# Patient Record
Sex: FEMALE | Race: WHITE | NOT HISPANIC OR LATINO | Employment: OTHER | URBAN - METROPOLITAN AREA
[De-identification: names, ages, dates, MRNs, and addresses within clinical notes are randomized per-mention and may not be internally consistent; named-entity substitution may affect disease eponyms.]

---

## 2023-04-06 ENCOUNTER — TELEPHONE (OUTPATIENT)
Dept: PRIMARY CARE | Facility: CLINIC | Age: 73
End: 2023-04-06
Payer: MEDICARE

## 2023-04-27 DIAGNOSIS — E78.5 HYPERLIPIDEMIA, UNSPECIFIED HYPERLIPIDEMIA TYPE: Primary | ICD-10-CM

## 2023-04-27 RX ORDER — ROSUVASTATIN CALCIUM 10 MG/1
10 TABLET, COATED ORAL DAILY
Qty: 90 TABLET | Refills: 0 | Status: SHIPPED | OUTPATIENT
Start: 2023-04-27 | End: 2023-05-05 | Stop reason: SDUPTHER

## 2023-04-27 RX ORDER — ROSUVASTATIN CALCIUM 10 MG/1
10 TABLET, COATED ORAL DAILY
COMMUNITY
End: 2023-04-27 | Stop reason: SDUPTHER

## 2023-05-01 ENCOUNTER — OFFICE VISIT (OUTPATIENT)
Dept: PRIMARY CARE | Facility: CLINIC | Age: 73
End: 2023-05-01
Payer: MEDICARE

## 2023-05-01 VITALS
BODY MASS INDEX: 25.11 KG/M2 | DIASTOLIC BLOOD PRESSURE: 74 MMHG | SYSTOLIC BLOOD PRESSURE: 123 MMHG | RESPIRATION RATE: 15 BRPM | WEIGHT: 133 LBS | HEIGHT: 61 IN | HEART RATE: 75 BPM | OXYGEN SATURATION: 95 %

## 2023-05-01 DIAGNOSIS — N64.4 BREAST PAIN, LEFT: Primary | ICD-10-CM

## 2023-05-01 PROBLEM — H50.00 ESOTROPIA: Status: RESOLVED | Noted: 2023-05-01 | Resolved: 2023-05-01

## 2023-05-01 PROBLEM — L30.9 DERMATITIS, ECZEMATOID: Status: ACTIVE | Noted: 2023-05-01

## 2023-05-01 PROBLEM — F43.9 STRESS: Status: ACTIVE | Noted: 2023-05-01

## 2023-05-01 PROBLEM — J45.909 ASTHMA (HHS-HCC): Status: ACTIVE | Noted: 2023-05-01

## 2023-05-01 PROBLEM — I38 HEART VALVE REGURGITATION: Status: ACTIVE | Noted: 2023-05-01

## 2023-05-01 PROBLEM — J20.9 ACUTE BRONCHITIS, UNSPECIFIED: Status: RESOLVED | Noted: 2023-05-01 | Resolved: 2023-05-01

## 2023-05-01 PROBLEM — D12.6 COLON ADENOMA: Status: ACTIVE | Noted: 2023-05-01

## 2023-05-01 PROBLEM — I10 BENIGN ESSENTIAL HYPERTENSION: Status: ACTIVE | Noted: 2023-05-01

## 2023-05-01 PROBLEM — H53.2 DIPLOPIA: Status: RESOLVED | Noted: 2023-05-01 | Resolved: 2023-05-01

## 2023-05-01 PROBLEM — Z95.0 PACEMAKER: Status: ACTIVE | Noted: 2023-05-01

## 2023-05-01 PROBLEM — E78.49 FAMILIAL COMBINED HYPERLIPIDEMIA: Status: ACTIVE | Noted: 2023-05-01

## 2023-05-01 PROBLEM — F41.9 ANXIETY: Status: ACTIVE | Noted: 2023-05-01

## 2023-05-01 PROBLEM — I50.30 DIASTOLIC CONGESTIVE HEART FAILURE (MULTI): Status: ACTIVE | Noted: 2023-05-01

## 2023-05-01 PROBLEM — D86.9 SARCOIDOSIS: Status: ACTIVE | Noted: 2023-05-01

## 2023-05-01 PROCEDURE — 3078F DIAST BP <80 MM HG: CPT | Performed by: INTERNAL MEDICINE

## 2023-05-01 PROCEDURE — 1160F RVW MEDS BY RX/DR IN RCRD: CPT | Performed by: INTERNAL MEDICINE

## 2023-05-01 PROCEDURE — 1159F MED LIST DOCD IN RCRD: CPT | Performed by: INTERNAL MEDICINE

## 2023-05-01 PROCEDURE — 3074F SYST BP LT 130 MM HG: CPT | Performed by: INTERNAL MEDICINE

## 2023-05-01 PROCEDURE — 99213 OFFICE O/P EST LOW 20 MIN: CPT | Performed by: INTERNAL MEDICINE

## 2023-05-01 PROCEDURE — 1036F TOBACCO NON-USER: CPT | Performed by: INTERNAL MEDICINE

## 2023-05-01 RX ORDER — CARVEDILOL 6.25 MG/1
6.25 TABLET ORAL
COMMUNITY

## 2023-05-01 RX ORDER — ACETAMINOPHEN 500 MG
1 TABLET ORAL DAILY
COMMUNITY

## 2023-05-01 RX ORDER — FLUOXETINE HYDROCHLORIDE 40 MG/1
40 CAPSULE ORAL DAILY
COMMUNITY
End: 2023-10-12 | Stop reason: SDUPTHER

## 2023-05-01 RX ORDER — SPIRONOLACTONE 25 MG/1
25 TABLET ORAL DAILY
COMMUNITY

## 2023-05-01 RX ORDER — BUMETANIDE 1 MG/1
1 TABLET ORAL DAILY
COMMUNITY

## 2023-05-01 ASSESSMENT — ENCOUNTER SYMPTOMS: PAIN: 1

## 2023-05-01 NOTE — PROGRESS NOTES
Subjective   Patient ID: Radhika Villagomez is a 72 y.o. female who presents for Pain (Pt experiencing left breast pain and burning x 3 weeks ).  Pain         left breast   pain   x 3 weeks.   Pain more at night when trying to  sleep when not  active . Burning - tylenol helps.   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   Goes to gym and lifts  wt.   Mammo  oct            Review of Systems   Genitourinary:         Left breast pain      3 weeks.     Objective   Physical Exam  Vitals and nursing note reviewed. Exam conducted with a chaperone present.   Constitutional:       Appearance: Normal appearance.   HENT:      Head: Normocephalic and atraumatic.      Mouth/Throat:      Pharynx: Oropharynx is clear.   Eyes:      Conjunctiva/sclera: Conjunctivae normal.   Cardiovascular:      Rate and Rhythm: Normal rate.   Pulmonary:      Effort: Pulmonary effort is normal.      Comments: Chest -  left breast  tenderness mild to palpation . No mass fonund.  No  axillary mass, but tender. No rash.   Musculoskeletal:      Cervical back: Neck supple.   Skin:     General: Skin is warm and dry.   Neurological:      General: No focal deficit present.      Mental Status: She is alert and oriented to person, place, and time. Mental status is at baseline.   Psychiatric:         Mood and Affect: Mood normal.         Behavior: Behavior normal.         Thought Content: Thought content normal.         Judgment: Judgment normal.         Assessment/Plan   Problem List Items Addressed This Visit          Medium    Breast pain, left - Primary    Relevant Orders    BI US breast complete left    BI mammo left diagnostic tomosynthesis

## 2023-05-05 DIAGNOSIS — E78.5 HYPERLIPIDEMIA, UNSPECIFIED HYPERLIPIDEMIA TYPE: ICD-10-CM

## 2023-05-05 RX ORDER — ROSUVASTATIN CALCIUM 10 MG/1
10 TABLET, COATED ORAL DAILY
Qty: 90 TABLET | Refills: 0 | Status: SHIPPED | OUTPATIENT
Start: 2023-05-05 | End: 2023-08-07 | Stop reason: SDUPTHER

## 2023-05-09 ENCOUNTER — TELEPHONE (OUTPATIENT)
Dept: PRIMARY CARE | Facility: CLINIC | Age: 73
End: 2023-05-09
Payer: MEDICARE

## 2023-05-09 NOTE — TELEPHONE ENCOUNTER
----- Message from Odalis Watson MD sent at 5/8/2023  3:16 PM EDT -----  Call  results message below.

## 2023-05-10 ENCOUNTER — APPOINTMENT (OUTPATIENT)
Dept: PRIMARY CARE | Facility: CLINIC | Age: 73
End: 2023-05-10
Payer: MEDICARE

## 2023-06-06 ENCOUNTER — PATIENT OUTREACH (OUTPATIENT)
Dept: PRIMARY CARE | Facility: CLINIC | Age: 73
End: 2023-06-06
Payer: MEDICARE

## 2023-06-13 ENCOUNTER — DOCUMENTATION (OUTPATIENT)
Dept: CARE COORDINATION | Facility: CLINIC | Age: 73
End: 2023-06-13
Payer: MEDICARE

## 2023-08-07 ENCOUNTER — TELEPHONE (OUTPATIENT)
Dept: PRIMARY CARE | Facility: CLINIC | Age: 73
End: 2023-08-07
Payer: MEDICARE

## 2023-08-07 DIAGNOSIS — E78.5 HYPERLIPIDEMIA, UNSPECIFIED HYPERLIPIDEMIA TYPE: ICD-10-CM

## 2023-08-07 RX ORDER — ROSUVASTATIN CALCIUM 10 MG/1
10 TABLET, COATED ORAL DAILY
Qty: 30 TABLET | Refills: 1 | Status: SHIPPED | OUTPATIENT
Start: 2023-08-07 | End: 2023-08-21 | Stop reason: SDUPTHER

## 2023-08-07 NOTE — TELEPHONE ENCOUNTER
----- Message from Odalis Watson MD sent at 8/7/2023 11:36 AM EDT -----  Schedule  awv for October.

## 2023-08-15 PROBLEM — I34.0 NONRHEUMATIC MITRAL VALVE REGURGITATION: Status: ACTIVE | Noted: 2023-08-15

## 2023-08-15 PROBLEM — Z87.74 HX OF ATRIAL SEPTAL DEFECT REPAIR: Status: ACTIVE | Noted: 2023-08-15

## 2023-08-15 PROBLEM — I51.89 DIASTOLIC DYSFUNCTION: Status: ACTIVE | Noted: 2023-05-01

## 2023-08-15 PROBLEM — I27.20 PULMONARY HTN (MULTI): Status: ACTIVE | Noted: 2023-08-15

## 2023-08-15 PROBLEM — I36.1 NONRHEUMATIC TRICUSPID VALVE REGURGITATION: Status: ACTIVE | Noted: 2023-08-15

## 2023-08-15 PROBLEM — I37.1 MODERATE PULMONARY VALVE INSUFFICIENCY: Status: ACTIVE | Noted: 2023-08-15

## 2023-08-15 PROBLEM — I51.7 ATRIAL ENLARGEMENT, BILATERAL: Status: ACTIVE | Noted: 2023-08-15

## 2023-08-21 DIAGNOSIS — E78.5 HYPERLIPIDEMIA, UNSPECIFIED HYPERLIPIDEMIA TYPE: ICD-10-CM

## 2023-08-21 RX ORDER — ROSUVASTATIN CALCIUM 10 MG/1
10 TABLET, COATED ORAL DAILY
Qty: 30 TABLET | Refills: 2 | Status: SHIPPED | OUTPATIENT
Start: 2023-08-21 | End: 2023-10-12 | Stop reason: SDUPTHER

## 2023-10-12 ENCOUNTER — OFFICE VISIT (OUTPATIENT)
Dept: PRIMARY CARE | Facility: CLINIC | Age: 73
End: 2023-10-12
Payer: MEDICARE

## 2023-10-12 VITALS
OXYGEN SATURATION: 94 % | WEIGHT: 133 LBS | HEIGHT: 61 IN | DIASTOLIC BLOOD PRESSURE: 66 MMHG | BODY MASS INDEX: 25.11 KG/M2 | HEART RATE: 72 BPM | SYSTOLIC BLOOD PRESSURE: 107 MMHG

## 2023-10-12 DIAGNOSIS — F41.9 ANXIETY: ICD-10-CM

## 2023-10-12 DIAGNOSIS — Z23 NEED FOR VACCINATION: ICD-10-CM

## 2023-10-12 DIAGNOSIS — J44.9 CHRONIC OBSTRUCTIVE PULMONARY DISEASE, UNSPECIFIED COPD TYPE (MULTI): ICD-10-CM

## 2023-10-12 DIAGNOSIS — Z29.11 NEED FOR RSV IMMUNIZATION: ICD-10-CM

## 2023-10-12 DIAGNOSIS — Z95.0 PACEMAKER: ICD-10-CM

## 2023-10-12 DIAGNOSIS — Z13.89 SCREENING FOR MULTIPLE CONDITIONS: ICD-10-CM

## 2023-10-12 DIAGNOSIS — Z12.11 SCREENING FOR COLORECTAL CANCER: ICD-10-CM

## 2023-10-12 DIAGNOSIS — I27.20 PULMONARY HTN (MULTI): ICD-10-CM

## 2023-10-12 DIAGNOSIS — E78.5 HYPERLIPIDEMIA, UNSPECIFIED HYPERLIPIDEMIA TYPE: ICD-10-CM

## 2023-10-12 DIAGNOSIS — Z12.31 ENCOUNTER FOR SCREENING MAMMOGRAM FOR BREAST CANCER: ICD-10-CM

## 2023-10-12 DIAGNOSIS — Z00.00 ROUTINE GENERAL MEDICAL EXAMINATION AT HEALTH CARE FACILITY: Primary | ICD-10-CM

## 2023-10-12 DIAGNOSIS — Z12.12 SCREENING FOR COLORECTAL CANCER: ICD-10-CM

## 2023-10-12 DIAGNOSIS — E78.49 FAMILIAL COMBINED HYPERLIPIDEMIA: ICD-10-CM

## 2023-10-12 DIAGNOSIS — Z11.59 NEED FOR HEPATITIS C SCREENING TEST: ICD-10-CM

## 2023-10-12 DIAGNOSIS — I51.89 DIASTOLIC DYSFUNCTION: ICD-10-CM

## 2023-10-12 DIAGNOSIS — Z13.6 SCREENING FOR CARDIOVASCULAR CONDITION: ICD-10-CM

## 2023-10-12 DIAGNOSIS — I37.1 MODERATE PULMONARY VALVE INSUFFICIENCY: ICD-10-CM

## 2023-10-12 DIAGNOSIS — Z13.1 DIABETES MELLITUS SCREENING: ICD-10-CM

## 2023-10-12 DIAGNOSIS — I34.0 NONRHEUMATIC MITRAL VALVE REGURGITATION: ICD-10-CM

## 2023-10-12 DIAGNOSIS — I44.2 COMPLETE HEART BLOCK (MULTI): ICD-10-CM

## 2023-10-12 PROBLEM — D12.6 COLON ADENOMA: Status: RESOLVED | Noted: 2023-05-01 | Resolved: 2023-10-12

## 2023-10-12 PROCEDURE — 90662 IIV NO PRSV INCREASED AG IM: CPT | Performed by: INTERNAL MEDICINE

## 2023-10-12 PROCEDURE — G0439 PPPS, SUBSEQ VISIT: HCPCS | Performed by: INTERNAL MEDICINE

## 2023-10-12 PROCEDURE — 1036F TOBACCO NON-USER: CPT | Performed by: INTERNAL MEDICINE

## 2023-10-12 PROCEDURE — 1126F AMNT PAIN NOTED NONE PRSNT: CPT | Performed by: INTERNAL MEDICINE

## 2023-10-12 PROCEDURE — 1160F RVW MEDS BY RX/DR IN RCRD: CPT | Performed by: INTERNAL MEDICINE

## 2023-10-12 PROCEDURE — 99213 OFFICE O/P EST LOW 20 MIN: CPT | Performed by: INTERNAL MEDICINE

## 2023-10-12 PROCEDURE — 3074F SYST BP LT 130 MM HG: CPT | Performed by: INTERNAL MEDICINE

## 2023-10-12 PROCEDURE — 1159F MED LIST DOCD IN RCRD: CPT | Performed by: INTERNAL MEDICINE

## 2023-10-12 PROCEDURE — 3078F DIAST BP <80 MM HG: CPT | Performed by: INTERNAL MEDICINE

## 2023-10-12 PROCEDURE — 1170F FXNL STATUS ASSESSED: CPT | Performed by: INTERNAL MEDICINE

## 2023-10-12 PROCEDURE — G0008 ADMIN INFLUENZA VIRUS VAC: HCPCS | Performed by: INTERNAL MEDICINE

## 2023-10-12 RX ORDER — ROSUVASTATIN CALCIUM 10 MG/1
10 TABLET, COATED ORAL DAILY
Qty: 90 TABLET | Refills: 3 | Status: SHIPPED | OUTPATIENT
Start: 2023-10-12 | End: 2024-01-29 | Stop reason: SDUPTHER

## 2023-10-12 RX ORDER — FLUOXETINE HYDROCHLORIDE 40 MG/1
40 CAPSULE ORAL DAILY
Qty: 90 CAPSULE | Refills: 3 | Status: SHIPPED | OUTPATIENT
Start: 2023-10-12

## 2023-10-12 ASSESSMENT — PATIENT HEALTH QUESTIONNAIRE - PHQ9
SUM OF ALL RESPONSES TO PHQ9 QUESTIONS 1 AND 2: 0
2. FEELING DOWN, DEPRESSED OR HOPELESS: NOT AT ALL
1. LITTLE INTEREST OR PLEASURE IN DOING THINGS: NOT AT ALL

## 2023-10-12 ASSESSMENT — ACTIVITIES OF DAILY LIVING (ADL)
BATHING: INDEPENDENT
TAKING_MEDICATION: INDEPENDENT
DRESSING: INDEPENDENT
MANAGING_FINANCES: INDEPENDENT
GROCERY_SHOPPING: INDEPENDENT

## 2023-10-12 ASSESSMENT — ENCOUNTER SYMPTOMS
CONSTITUTIONAL NEGATIVE: 1
DEPRESSION: 0

## 2023-10-12 NOTE — PROGRESS NOTES
"Subjective   Patient ID: Radhika Villagomez is a 73 y.o. female who presents for AWV (AWV, RFS, mammo order. ).  HPI    Awv    No  new probs      Just  refills      Review of Systems   Constitutional: Negative.    All other systems reviewed and are negative.      Objective   BP Readings from Last 3 Encounters:   10/12/23 107/66   05/01/23 123/74   10/04/22 112/61      Wt Readings from Last 3 Encounters:   10/12/23 60.3 kg (133 lb)   05/01/23 60.3 kg (133 lb)   10/04/22 62.1 kg (136 lb 12.8 oz)      BMI: Estimated body mass index is 25.13 kg/m² as calculated from the following:    Height as of this encounter: 1.549 m (5' 1\").    Weight as of this encounter: 60.3 kg (133 lb).    BSA: Estimated body surface area is 1.61 meters squared as calculated from the following:    Height as of this encounter: 1.549 m (5' 1\").    Weight as of this encounter: 60.3 kg (133 lb).  Physical Exam  Vitals and nursing note reviewed.   Constitutional:       Appearance: Normal appearance.   HENT:      Head: Normocephalic and atraumatic.      Nose: Nose normal.   Eyes:      Conjunctiva/sclera: Conjunctivae normal.   Cardiovascular:      Rate and Rhythm: Normal rate and regular rhythm.      Pulses: Normal pulses.      Heart sounds: Normal heart sounds.   Pulmonary:      Effort: Pulmonary effort is normal.      Breath sounds: Normal breath sounds.   Abdominal:      General: Abdomen is flat.   Musculoskeletal:         General: Normal range of motion.   Skin:     General: Skin is warm and dry.      Capillary Refill: Capillary refill takes less than 2 seconds.   Neurological:      General: No focal deficit present.      Mental Status: She is alert and oriented to person, place, and time. Mental status is at baseline.   Psychiatric:         Mood and Affect: Mood normal.         Behavior: Behavior normal.         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    Nonrheumatic mitral valve regurgitation I34.0    Pacemaker Z95.0    Pulmonary HTN (CMS/HCC) I27.20     Other Visit Diagnoses         Codes    Routine general medical examination at health care facility    -  Primary Z00.00    Relevant Orders    1 Year Follow Up In Primary Care - Wellness Exam    Hyperlipidemia, unspecified hyperlipidemia type     E78.5    Relevant Medications    rosuvastatin (Crestor) 10 mg tablet    Screening for multiple conditions     Z13.89    Diabetes mellitus screening     Z13.1    Relevant Orders    Comprehensive Metabolic Panel    Screening for cardiovascular condition     Z13.6    Relevant Orders    Lipid panel    Need for vaccination     Z23    Encounter for screening mammogram for breast cancer     Z12.31    Relevant Orders    BI mammo bilateral screening tomosynthesis    Need for hepatitis C screening test     Z11.59    Relevant Orders    Hepatitis C antibody    Screening for colorectal cancer     Z12.11, Z12.12    Relevant Orders    Colonoscopy Screening    Need for RSV immunization     Z29.11    Relevant Orders    Flu vaccine, quadrivalent, high-dose, preservative free, age 65y+ (FLUZONE) (Completed)          Chronic problems controlled  on current treatment  unless otherwise noted.   CHART  REVIEWED AND  RECONCILED WITH OLD DATA / UPDATED IN NEW SYSTEM:  MEDS,  PROBLEMS,  ALLERGIES, SOC HISTORY.

## 2023-10-19 ENCOUNTER — LAB (OUTPATIENT)
Dept: LAB | Facility: LAB | Age: 73
End: 2023-10-19
Payer: MEDICARE

## 2023-10-19 DIAGNOSIS — Z13.6 SCREENING FOR CARDIOVASCULAR CONDITION: ICD-10-CM

## 2023-10-19 DIAGNOSIS — Z13.1 DIABETES MELLITUS SCREENING: ICD-10-CM

## 2023-10-19 DIAGNOSIS — Z11.59 NEED FOR HEPATITIS C SCREENING TEST: ICD-10-CM

## 2023-10-19 LAB
ALBUMIN SERPL BCP-MCNC: 5.1 G/DL (ref 3.4–5)
ALP SERPL-CCNC: 68 U/L (ref 33–136)
ALT SERPL W P-5'-P-CCNC: 13 U/L (ref 7–45)
ANION GAP SERPL CALC-SCNC: 10 MMOL/L (ref 10–20)
AST SERPL W P-5'-P-CCNC: 23 U/L (ref 9–39)
BILIRUB SERPL-MCNC: 0.8 MG/DL (ref 0–1.2)
BUN SERPL-MCNC: 18 MG/DL (ref 6–23)
CALCIUM SERPL-MCNC: 10.2 MG/DL (ref 8.6–10.6)
CHLORIDE SERPL-SCNC: 98 MMOL/L (ref 98–107)
CHOLEST SERPL-MCNC: 157 MG/DL (ref 0–199)
CHOLESTEROL/HDL RATIO: 2.7
CO2 SERPL-SCNC: 32 MMOL/L (ref 21–32)
CREAT SERPL-MCNC: 0.69 MG/DL (ref 0.5–1.05)
GFR SERPL CREATININE-BSD FRML MDRD: >90 ML/MIN/1.73M*2
GLUCOSE SERPL-MCNC: 78 MG/DL (ref 74–99)
HCV AB SER QL: NONREACTIVE
HDLC SERPL-MCNC: 57.7 MG/DL
LDLC SERPL CALC-MCNC: 75 MG/DL
NON HDL CHOLESTEROL: 99 MG/DL (ref 0–149)
POTASSIUM SERPL-SCNC: 4.8 MMOL/L (ref 3.5–5.3)
PROT SERPL-MCNC: 7.7 G/DL (ref 6.4–8.2)
SODIUM SERPL-SCNC: 135 MMOL/L (ref 136–145)
TRIGL SERPL-MCNC: 123 MG/DL (ref 0–149)
VLDL: 25 MG/DL (ref 0–40)

## 2023-10-19 PROCEDURE — 80053 COMPREHEN METABOLIC PANEL: CPT

## 2023-10-19 PROCEDURE — 86803 HEPATITIS C AB TEST: CPT

## 2023-10-19 PROCEDURE — 80061 LIPID PANEL: CPT

## 2023-10-19 PROCEDURE — 36415 COLL VENOUS BLD VENIPUNCTURE: CPT

## 2023-10-27 PROBLEM — L81.4 OTHER MELANIN HYPERPIGMENTATION: Status: ACTIVE | Noted: 2023-08-14

## 2023-10-27 PROBLEM — G47.00 INSOMNIA: Status: ACTIVE | Noted: 2023-10-27

## 2023-10-27 PROBLEM — H52.209 ASTIGMATISM OF EYE: Status: ACTIVE | Noted: 2023-10-27

## 2023-10-27 PROBLEM — L82.1 OTHER SEBORRHEIC KERATOSIS: Status: ACTIVE | Noted: 2023-08-14

## 2023-10-27 PROBLEM — D22.5 MELANOCYTIC NEVI OF TRUNK: Status: ACTIVE | Noted: 2023-08-14

## 2023-10-27 PROBLEM — C44.619 BASAL CELL CARCINOMA OF SKIN OF LEFT UPPER LIMB, INCLUDING SHOULDER: Status: ACTIVE | Noted: 2023-08-14

## 2023-10-27 PROBLEM — R06.02 SHORTNESS OF BREATH: Status: ACTIVE | Noted: 2023-10-27

## 2023-10-27 PROBLEM — D18.01 HEMANGIOMA OF SKIN AND SUBCUTANEOUS TISSUE: Status: ACTIVE | Noted: 2023-08-14

## 2023-10-27 PROBLEM — D22.60 MELANOCYTIC NEVI OF UNSPECIFIED UPPER LIMB, INCLUDING SHOULDER: Status: ACTIVE | Noted: 2023-08-14

## 2023-10-27 PROBLEM — Z85.828 PERSONAL HISTORY OF OTHER MALIGNANT NEOPLASM OF SKIN: Status: ACTIVE | Noted: 2023-08-14

## 2023-10-27 PROBLEM — D48.5 NEOPLASM OF UNCERTAIN BEHAVIOR OF SKIN: Status: ACTIVE | Noted: 2023-08-14

## 2023-10-27 PROBLEM — D22.70 MELANOCYTIC NEVI OF UNSPECIFIED LOWER LIMB, INCLUDING HIP: Status: ACTIVE | Noted: 2023-08-14

## 2023-10-27 PROBLEM — L57.0 ACTINIC KERATOSIS: Status: ACTIVE | Noted: 2023-08-14

## 2023-10-31 ENCOUNTER — PROCEDURE VISIT (OUTPATIENT)
Dept: DERMATOLOGY | Facility: CLINIC | Age: 73
End: 2023-10-31
Payer: MEDICARE

## 2023-10-31 DIAGNOSIS — C44.612 BASAL CELL CARCINOMA (BCC) OF SKIN OF RIGHT UPPER EXTREMITY INCLUDING SHOULDER: ICD-10-CM

## 2023-10-31 PROCEDURE — 88305 TISSUE EXAM BY PATHOLOGIST: CPT | Mod: TC,DER | Performed by: DERMATOLOGY

## 2023-10-31 PROCEDURE — 88305 TISSUE EXAM BY PATHOLOGIST: CPT | Performed by: DERMATOLOGY

## 2023-10-31 PROCEDURE — 12032 INTMD RPR S/A/T/EXT 2.6-7.5: CPT | Performed by: DERMATOLOGY

## 2023-10-31 PROCEDURE — 11602 EXC TR-EXT MAL+MARG 1.1-2 CM: CPT | Performed by: DERMATOLOGY

## 2023-10-31 NOTE — PROGRESS NOTES
Excision Operative Note    Date of Surgery:  10/31/2023  Surgeon:  Loretta Puckett MD  Office Location:  [address] [facility]  [address]  [facility] [address]  Dept: [phone]  Dept Fax: [phone]  Referring Provider: Gurwinder Macias, APRN-CNP  [address] [facility] [address]  [place] [address]    Subjective   Radhika Villagomez is a 73 y.o. female who presents for the following: Excision (Right Volar Forearm- BCC) for basal cell carcinoma.    According to the patient, the lesion has been present for approximately 1 month at the time of diagnosis.  The lesion is not causing symptoms.  The lesion has not been treated previously.    The patient does have a pacemaker / defibrillator.  The patient does not have a heart valve / joint replacement.    The patient is not on blood thinners.   The patient does not have a history of hepatitis B or C.  The patient does not have a history of HIV.    The following portions of the chart were reviewed this encounter and updated as appropriate:         Assessment/Plan   Pre-procedure:   Obtained informed consent: written from patient  The surgical site was identified and confirmed with the patient.     Intra-operative:   Audible time out called at : 1:38 PM 10/31/23  by: Tran Grove RN   Verified patient name, birthdate, site, specimen bottle label & requisition.    The planned procedure(s) was again reviewed with the patient. The risks of bleeding, infection, nerve damage and scarring were reviewed. The patient identity, surgical site, and planned procedure(s) were verified.     Basal cell carcinoma (BCC) of skin of right upper extremity including shoulder  Right Volar Forearm    Skin excision    Lesion length (cm):  0.3  Lesion width (cm):  0.7  Margin per side (cm):  0.5  Total excision diameter (cm):  1.7  Informed consent: discussed and consent obtained    Timeout: patient name, date of birth, surgical site, and procedure verified     Procedure prep:  Patient prepped in sterile fashion  Anesthesia: the lesion was anesthetized in a standard fashion    Anesthetic:  1.5% lidocaine w/ epinephrine 1-100,000 local infiltration  Instrument used: #15 blade    Hemostasis achieved with: electrodesiccation    Outcome: patient tolerated procedure well with no complications    Post-procedure details: sterile dressing applied and wound care instructions given    Dressing type: pressure dressing    Additional details:  The nature of the diagnosis was explained. The lesion is a skin cancer.  It is locally aggressive but has a very low to non-existent risk of spreading.  The condition is associated with sun exposure.  Warning signs of non-melanoma skin cancer discussed. Patient was instructed to perform monthly self skin examination.  We recommended that the patient have regular full skin exams given an increased risk of subsequent skin cancers. The patient was instructed to use sun protective behaviors including use of broad spectrum sunscreens and sun protective clothing to reduce risk of skin cancers.  Excision was discussed with the patient. The risks, benefits and potential adverse effects were reviewed. Discussion included but was not limited to the cure rate, relative cost, wound care requirements, activity restrictions, likely scar outcome and time to heal were reviewed. It was explained that the scar would be longer than the original lesion.  The patient elected to proceed with exicision today.    Skin repair  Complexity:  Intermediate  Final length (cm):  3.3  Informed consent: discussed and consent obtained    Timeout: patient name, date of birth, surgical site, and procedure verified    Procedure prep:  Patient prepped in sterile fashion  Anesthesia: the lesion was anesthetized in a standard fashion    Anesthetic:  1.5% lidocaine w/ epinephrine 1-100,000 local infiltration  Reason for type of repair: enhance both functionality and cosmetic results     Undermining: edges undermined    Subcutaneous layers (deep stitches):   Suture size:  3-0  Suture type: Vicryl (polyglactin 910)    Stitches:  Buried vertical mattress  Fine/surface layer approximation (top stitches):   Suture size:  5-0  Suture type: fast-absorbing plain gut    Stitches: simple running    Hemostasis achieved with: electrodesiccation  Outcome: patient tolerated procedure well with no complications    Post-procedure details: sterile dressing applied and wound care instructions given    Dressing type: pressure dressing      Specimen 1 - Dermatopathology- DERM LAB  Differential Diagnosis: BCC  Check Margins Yes  Comments:  Right Volar Forearm  Dermpath Lab: Routine Histopathology (formalin-fixed tissue)      Intermediate Linear Repair:  Given the location and size of the defect, it was determined that an intermediate layered linear closure was required to restore normal anatomy and function. The repair is an intermediate closure as two layers of sutures were required. The defect was undermined extensively at the level of the subcutaneous plane. Standing cutaneous cones were removed using Burow's triangles. The wound edges were brought into close approximation with buried vertical mattress sutures. The remainder of the wound was then closed with epidermal top sutures.    The final repair measured 3.3 cm    Wound care was discussed, and the patient was given written post-operative wound care instructions.      The patient will follow up with Loretta Puckett MD as needed for any post operative problems or concerns, and will follow up with their primary dermatologist as scheduled.

## 2023-11-02 LAB
LABORATORY COMMENT REPORT: NORMAL
PATH REPORT.FINAL DX SPEC: NORMAL
PATH REPORT.GROSS SPEC: NORMAL
PATH REPORT.MICROSCOPIC SPEC OTHER STN: NORMAL
PATH REPORT.RELEVANT HX SPEC: NORMAL
PATH REPORT.TOTAL CANCER: NORMAL

## 2024-01-29 DIAGNOSIS — E78.5 HYPERLIPIDEMIA, UNSPECIFIED HYPERLIPIDEMIA TYPE: ICD-10-CM

## 2024-01-29 RX ORDER — ROSUVASTATIN CALCIUM 10 MG/1
10 TABLET, COATED ORAL DAILY
Qty: 90 TABLET | Refills: 3 | Status: SHIPPED | OUTPATIENT
Start: 2024-01-29

## 2024-02-22 ENCOUNTER — HOSPITAL ENCOUNTER (OUTPATIENT)
Dept: RADIOLOGY | Facility: HOSPITAL | Age: 74
Discharge: HOME | End: 2024-02-22
Payer: MEDICARE

## 2024-02-22 ENCOUNTER — TELEPHONE (OUTPATIENT)
Dept: PRIMARY CARE | Facility: CLINIC | Age: 74
End: 2024-02-22
Payer: MEDICARE

## 2024-02-22 DIAGNOSIS — Z12.31 ENCOUNTER FOR SCREENING MAMMOGRAM FOR BREAST CANCER: ICD-10-CM

## 2024-02-22 DIAGNOSIS — Z12.11 COLON CANCER SCREENING: Primary | ICD-10-CM

## 2024-02-22 PROCEDURE — 77067 SCR MAMMO BI INCL CAD: CPT

## 2024-02-22 PROCEDURE — 77063 BREAST TOMOSYNTHESIS BI: CPT | Performed by: RADIOLOGY

## 2024-02-22 PROCEDURE — 77067 SCR MAMMO BI INCL CAD: CPT | Performed by: RADIOLOGY

## 2024-02-22 NOTE — TELEPHONE ENCOUNTER
Patients colonoscopy order expires in April she can't get in till June and would like a new order put in to be able to get it in June

## 2024-03-13 ENCOUNTER — HOSPITAL ENCOUNTER (OUTPATIENT)
Dept: RADIOLOGY | Facility: EXTERNAL LOCATION | Age: 74
Discharge: HOME | End: 2024-03-13
Payer: MEDICARE

## 2024-03-13 DIAGNOSIS — R06.2 WHEEZING: ICD-10-CM

## 2024-04-09 ENCOUNTER — HOSPITAL ENCOUNTER (OUTPATIENT)
Dept: CARDIOLOGY | Facility: CLINIC | Age: 74
Discharge: HOME | End: 2024-04-09
Payer: MEDICARE

## 2024-04-09 DIAGNOSIS — I44.2 CHB (COMPLETE HEART BLOCK) (MULTI): ICD-10-CM

## 2024-04-09 PROCEDURE — 93280 PM DEVICE PROGR EVAL DUAL: CPT | Performed by: INTERNAL MEDICINE

## 2024-04-09 PROCEDURE — 93280 PM DEVICE PROGR EVAL DUAL: CPT

## 2024-04-30 ENCOUNTER — OFFICE VISIT (OUTPATIENT)
Dept: DERMATOLOGY | Facility: CLINIC | Age: 74
End: 2024-04-30
Payer: MEDICARE

## 2024-04-30 DIAGNOSIS — L90.5 SCAR CONDITIONS AND FIBROSIS OF SKIN: ICD-10-CM

## 2024-04-30 DIAGNOSIS — L30.9 DERMATITIS: ICD-10-CM

## 2024-04-30 DIAGNOSIS — D22.9 BENIGN NEVUS: ICD-10-CM

## 2024-04-30 DIAGNOSIS — L81.4 LENTIGO: ICD-10-CM

## 2024-04-30 DIAGNOSIS — L57.0 ACTINIC KERATOSIS: Primary | ICD-10-CM

## 2024-04-30 DIAGNOSIS — L82.1 SEBORRHEIC KERATOSIS: ICD-10-CM

## 2024-04-30 DIAGNOSIS — Z85.828 HISTORY OF NONMELANOMA SKIN CANCER: ICD-10-CM

## 2024-04-30 DIAGNOSIS — D18.01 ANGIOMA OF SKIN: ICD-10-CM

## 2024-04-30 DIAGNOSIS — L57.9 SKIN CHANGES DUE TO CHRONIC EXPOSURE TO NONIONIZING RADIATION: ICD-10-CM

## 2024-04-30 PROCEDURE — 1160F RVW MEDS BY RX/DR IN RCRD: CPT | Performed by: NURSE PRACTITIONER

## 2024-04-30 PROCEDURE — 17003 DESTRUCT PREMALG LES 2-14: CPT | Performed by: NURSE PRACTITIONER

## 2024-04-30 PROCEDURE — 1159F MED LIST DOCD IN RCRD: CPT | Performed by: NURSE PRACTITIONER

## 2024-04-30 PROCEDURE — 17000 DESTRUCT PREMALG LESION: CPT | Performed by: NURSE PRACTITIONER

## 2024-04-30 PROCEDURE — 1036F TOBACCO NON-USER: CPT | Performed by: NURSE PRACTITIONER

## 2024-04-30 PROCEDURE — 99213 OFFICE O/P EST LOW 20 MIN: CPT | Performed by: NURSE PRACTITIONER

## 2024-04-30 NOTE — PATIENT INSTRUCTIONS

## 2024-04-30 NOTE — PROGRESS NOTES
Subjective     Radhika Villagomez is a 73 y.o. female who presents for the following: Skin Check.     Review of Systems:  No other skin or systemic complaints other than what is documented elsewhere in the note.    The following portions of the chart were reviewed this encounter and updated as appropriate:   Tobacco  Allergies  Meds  Problems  Med Hx  Surg Hx         Skin Cancer History  No skin cancer on file.      Specialty Problems          Dermatology Problems    Dermatitis, eczematoid    Actinic keratosis    Basal cell carcinoma of skin of left upper limb, including shoulder    Hemangioma of skin and subcutaneous tissue    Melanocytic nevi of trunk    Melanocytic nevi of unspecified lower limb, including hip    Melanocytic nevi of unspecified upper limb, including shoulder    Neoplasm of uncertain behavior of skin    Other melanin hyperpigmentation    Other seborrheic keratosis    Personal history of other malignant neoplasm of skin        Objective   Well appearing patient in no apparent distress; mood and affect are within normal limits.    A full examination was performed including scalp, head, eyes, ears, nose, lips, neck, chest, axillae, abdomen, back, buttocks, bilateral upper extremities, bilateral lower extremities, hands, feet, fingers, toes, fingernails, and toenails. All findings within normal limits unless otherwise noted below.      Assessment/Plan   1. Actinic keratosis (2)  Dorsum of Nose, Left Buccal Cheek  Thin erythematous papules with gritty scale    WHAT IS ACTINIC KERATOSIS?   - Actinic keratosis (AK) is a skin condition caused by sun damage. It causes scaly, rough, or bumpy spots on the skin.  - If left alone, AKs may turn into a skin cancer. People who burn easily or have trouble tanning are at more risk for developing AKs.   - There is no one test for AKs and diagnosis is made by clinical appearance. Treatment options include cryotherapy, therapy with lights, and various creams (e.g.,  topical 5-fluorocuracil, imiquimod).       To lower the chance of getting AK, you can:       ?  Stay out of the sun in the middle of the day (from 10 a.m. to 4 p.m.)       ?  Wear sunscreen - An SPF of at least 30 is best. The SPF number is on the sunscreen bottle or tube.       ?  Wear a wide-brimmed hat, long-sleeved shirt, long pants, or long skirt outside. A baseball hat does not give much protection.        ?  Do not use tanning beds.        ?  Keep a low-fat diet, less than 21% of calories should come from fat       ?  Take Vitamin B3 (nicotinomide) 500mg twice daily.      YOUR TREATMENT PLAN  - At this time I recommend treatment with cryotherapy.  - Possible side effects of liquid nitrogen treatment reviewed including formation of blisters, crusting, tenderness, scar, and discoloration which may be permanent.  - Patient advised to return the office for re-evaluation if the treated lesion(s) do not resolve within 4-6 weeks. Patient verbalizes understanding.    Destr of lesion - Dorsum of Nose, Left Buccal Cheek  Complexity: simple    Destruction method: cryotherapy    Informed consent: discussed and consent obtained    Timeout:  patient name, date of birth, surgical site, and procedure verified  Lesion destroyed using liquid nitrogen: Yes    Cryotherapy cycles:  2  Outcome: patient tolerated procedure well with no complications    Post-procedure details: wound care instructions given      2. Angioma of skin  Scattered cherry-red papule(s).    A cherry hemangioma is a small macule (small, flat, smooth area) or papule (small, solid bump) formed from an overgrowth of tiny blood vessels in the skin. Cherry hemangiomas are characteristically red or purplish in color. They often first appear in middle adulthood and usually increase in number with age. Cherry hemangiomas are noncancerous (benign) and are common in adults.    The present appearance of the lesion is not worrisome but it should continue to be observed  and testing/treatment may be warranted if change occurs.    3. Benign nevus  Scattered, uniform and benign-appearing, regular brown melanocytic papules and macules.    The present appearance of the lesion is not worrisome but it should continue to be observed and testing/treatment may be warranted if change occurs.    4. Seborrheic keratosis  Stuck on verrucous, tan-brown papules and plaques.      Seborrheic keratoses are common noncancerous (benign) growths of unknown cause seen in adults due to a thickening of an area of the top skin layer. Seborrheic keratoses may appear as if they are stuck on to the skin. They have distinct borders, and they may appear as papules (small, solid bumps) or plaques (solid, raised patches that are bigger than a thumbnail). They may be the same color as your skin, or they may be pink, light brown, darker brown, or very dark brown, or sometimes may appear black.    There is no way to prevent new seborrheic keratoses from forming. Seborrheic keratoses can be removed, but removal is considered a cosmetic issue and is usually not covered by insurance.    PLAN  No treatment is needed unless there is irritation from clothing, such as itching or bleeding.  2.   Some lotions containing alpha hydroxy acids, salicylic acid, or urea may make the areas feel smoother with regular use but will not eliminate them.    5. Lentigo  Scattered tan macules in sun-exposed areas.    A solar lentigo (plural, solar lentigines), also known as a sun-induced freckle or senile lentigo, is a dark (hyperpigmented) lesion caused by natural or artificial ultraviolet (UV) light. Solar lentigines may be single or multiple. This type of lentigo is different from a simple lentigo (lentigo simplex) because it is caused by exposure to UV light. Solar lentigines are benign, but they do indicate excessive sun exposure, a risk factor for the development of skin cancer.    To prevent solar lentigines, avoid exposure to  sunlight in midday (10 AM to 3 PM), wear sun-protective clothing (tightly woven clothes and hats), and apply sunscreen (SPF 30 UVA and UVB block).    The present appearance of the lesion is not worrisome but it should continue to be observed and testing/treatment may be warranted if change occurs.    6. Scar conditions and fibrosis of skin  Right Forearm - Anterior  Well healed scar at the site(s) of prior treatment with no evidence of recurrence.          The scar is clear, there is no evidence of recurrence.  The present appearance of the scar is not worrisome but it should continue to be observed and testing/treatment may be warranted if change occurs.      7. History of nonmelanoma skin cancer    ABCDEs of melanoma and atypical moles were discussed with the patient.    Patient was instructed to perform monthly self skin examination.  We recommended that the patient have regular full skin exams given an increased risk of subsequent skin cancers.    The patient was instructed to use sun protective behaviors including use of broad spectrum sunscreens and sun protective clothing to reduce risk of skin cancers.    Warning signs of non-melanoma skin cancer discussed.    8. Skin changes due to chronic exposure to nonionizing radiation  Actinic changes in the form of freckles, lentigines and hyper/hypopigmentation     ABCDEs of melanoma and atypical moles were discussed with the patient.    Patient was instructed to perform monthly self skin examination.  We recommended that the patient have regular full skin exams given an increased risk of subsequent skin cancers.    The patient was instructed to use sun protective behaviors including use of broad spectrum sunscreens and sun protective clothing to reduce risk of skin cancers.    Warning signs of non-melanoma skin cancer discussed.    9. Dermatitis  Few erythematous scaly macules and papules noted on the arms and legs <3% BSA    Reports using TAC BID PRN, not using  more than 14 days a month. Continue with TAC BID PRN.    The following information regarding the use of topical steroids was provided: Local skin thinning,striae, and telangiectasia can occur with chronic application of this medication.  Long term use oftopical steroids should be avoided            Return in 6 months for routine skin check or return to clinic sooner if needed

## 2024-06-26 DIAGNOSIS — Z95.0 PACEMAKER: Primary | ICD-10-CM

## 2024-06-26 DIAGNOSIS — I49.5 SICK SINUS SYNDROME DUE TO SA NODE DYSFUNCTION (MULTI): ICD-10-CM

## 2024-07-02 ENCOUNTER — HOSPITAL ENCOUNTER (OUTPATIENT)
Dept: CARDIOLOGY | Facility: CLINIC | Age: 74
Discharge: HOME | End: 2024-07-02
Payer: MEDICARE

## 2024-07-02 DIAGNOSIS — I44.2 CHB (COMPLETE HEART BLOCK) (MULTI): ICD-10-CM

## 2024-07-02 PROCEDURE — 93280 PM DEVICE PROGR EVAL DUAL: CPT

## 2024-07-02 PROCEDURE — 93280 PM DEVICE PROGR EVAL DUAL: CPT | Performed by: NURSE PRACTITIONER

## 2024-09-07 PROBLEM — R42 DIZZINESS: Status: ACTIVE | Noted: 2024-09-07

## 2024-09-07 PROBLEM — N64.4 BREAST PAIN, LEFT: Status: RESOLVED | Noted: 2023-05-01 | Resolved: 2024-09-07

## 2024-09-07 PROBLEM — Z86.0100 HISTORY OF COLONIC POLYPS: Status: ACTIVE | Noted: 2024-09-07

## 2024-09-07 PROBLEM — J18.9 COMMUNITY ACQUIRED PNEUMONIA: Status: RESOLVED | Noted: 2024-09-07 | Resolved: 2024-09-07

## 2024-09-07 PROBLEM — R06.02 SHORTNESS OF BREATH: Status: RESOLVED | Noted: 2023-10-27 | Resolved: 2024-09-07

## 2024-09-07 PROBLEM — F32.A DEPRESSIVE DISORDER: Status: ACTIVE | Noted: 2024-09-07

## 2024-09-07 PROBLEM — R42 DIZZINESS: Status: RESOLVED | Noted: 2024-09-07 | Resolved: 2024-09-07

## 2024-09-07 PROBLEM — J18.9 COMMUNITY ACQUIRED PNEUMONIA: Status: ACTIVE | Noted: 2024-09-07

## 2024-09-07 PROBLEM — Z86.010 HISTORY OF COLONIC POLYPS: Status: ACTIVE | Noted: 2024-09-07

## 2024-09-07 PROBLEM — R00.1 BRADYCARDIA: Status: ACTIVE | Noted: 2024-09-07
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
## 2024-09-10 ENCOUNTER — TELEPHONE (OUTPATIENT)
Dept: GASTROENTEROLOGY | Facility: HOSPITAL | Age: 74
End: 2024-09-10
Payer: MEDICARE

## 2024-09-10 NOTE — TELEPHONE ENCOUNTER
Patient is having abdominal cramping, upper and lower  Some constipation  No bleeding  [phone]

## 2024-09-16 ENCOUNTER — APPOINTMENT (OUTPATIENT)
Dept: PRIMARY CARE | Facility: CLINIC | Age: 74
End: 2024-09-16
Payer: MEDICARE

## 2024-09-17 ENCOUNTER — OFFICE VISIT (OUTPATIENT)
Dept: GASTROENTEROLOGY | Facility: CLINIC | Age: 74
End: 2024-09-17
Payer: MEDICARE

## 2024-09-17 VITALS
DIASTOLIC BLOOD PRESSURE: 80 MMHG | BODY MASS INDEX: 25.3 KG/M2 | WEIGHT: 134 LBS | HEART RATE: 74 BPM | HEIGHT: 61 IN | TEMPERATURE: 97.1 F | SYSTOLIC BLOOD PRESSURE: 126 MMHG

## 2024-09-17 DIAGNOSIS — R10.30 LOWER ABDOMINAL PAIN: Primary | ICD-10-CM

## 2024-09-17 DIAGNOSIS — K59.9 MALDIGESTION SYNDROME: ICD-10-CM

## 2024-09-17 PROCEDURE — 3074F SYST BP LT 130 MM HG: CPT | Performed by: INTERNAL MEDICINE

## 2024-09-17 PROCEDURE — 3079F DIAST BP 80-89 MM HG: CPT | Performed by: INTERNAL MEDICINE

## 2024-09-17 PROCEDURE — 3008F BODY MASS INDEX DOCD: CPT | Performed by: INTERNAL MEDICINE

## 2024-09-17 PROCEDURE — 99213 OFFICE O/P EST LOW 20 MIN: CPT | Performed by: INTERNAL MEDICINE

## 2024-09-17 PROCEDURE — 1036F TOBACCO NON-USER: CPT | Performed by: INTERNAL MEDICINE

## 2024-09-17 PROCEDURE — 1159F MED LIST DOCD IN RCRD: CPT | Performed by: INTERNAL MEDICINE

## 2024-09-17 RX ORDER — FLUCONAZOLE 200 MG/1
TABLET ORAL
COMMUNITY
Start: 2024-03-26

## 2024-09-17 RX ORDER — AMOXICILLIN 500 MG/1
TABLET, FILM COATED ORAL
COMMUNITY
Start: 2023-12-15

## 2024-09-17 NOTE — PROGRESS NOTES
Subjective   Patient ID: Radhiak Villagomez is a 74 y.o. female.    Here for new issue of abdominal bloating in the upper and lower abdomen    Cramping before and after BM  Stool soft  No blood  Weight stable   Feels bloated   Last year she had antibiotics for bronchitis  No Covid infections    Colonoscopy with Dr. Pierre 2022 - 3 polyps removed and recommended 3 year follow up (2025)  Colonoscopy with myself 2020 TA removed             Review of Systems    Objective   Physical Exam    Assessment/Plan   Diagnoses and all orders for this visit:  Lower abdominal pain  -     Pancreatic Elastase, Fecal; Future  -     Calprotectin, Fecal; Future  -     C. difficile, PCR  Maldigestion syndrome  -     Pancreatic Elastase, Fecal; Future  -     Calprotectin, Fecal; Future  -     C. difficile, PCR

## 2024-09-17 NOTE — PATIENT INSTRUCTIONS
Your symptoms are consistent with maldigestion which can be several different diagnoses including dairy intolerance.    Please start a dairy free diet; if you cannot say for sure if it helps or not different, testing with lactose tolerance test is recommended.    Stool sample testing has been ordered.    A colonoscopy to follow the stool testing is reasonable this year or next year based on your current symptoms and history of several colon polyps.

## 2024-09-18 ENCOUNTER — LAB (OUTPATIENT)
Dept: LAB | Facility: LAB | Age: 74
End: 2024-09-18
Payer: MEDICARE

## 2024-09-18 DIAGNOSIS — K59.9 MALDIGESTION SYNDROME: ICD-10-CM

## 2024-09-18 DIAGNOSIS — R10.30 LOWER ABDOMINAL PAIN: ICD-10-CM

## 2024-09-18 PROCEDURE — 87493 C DIFF AMPLIFIED PROBE: CPT

## 2024-09-18 PROCEDURE — 82653 EL-1 FECAL QUANTITATIVE: CPT

## 2024-09-18 PROCEDURE — 83993 ASSAY FOR CALPROTECTIN FECAL: CPT

## 2024-09-19 LAB — C DIF TOX TCDA+TCDB STL QL NAA+PROBE: NOT DETECTED

## 2024-09-21 LAB
CALPROTECTIN STL-MCNT: 70 UG/G
ELASTASE PANC STL-MCNT: >800 UG/G

## 2024-09-24 ENCOUNTER — TELEPHONE (OUTPATIENT)
Dept: GASTROENTEROLOGY | Facility: CLINIC | Age: 74
End: 2024-09-24
Payer: MEDICARE

## 2024-09-24 DIAGNOSIS — K58.0 IRRITABLE BOWEL SYNDROME WITH DIARRHEA: Primary | ICD-10-CM

## 2024-09-24 NOTE — TELEPHONE ENCOUNTER
Called about stool testing.  No EPI.  No C diff.  Mild increase in calprotectin level.    Recommend course of Xifaxan if coverage reasonable.  Rx sent.    Aleksandr Carcamo, DO

## 2024-09-26 ENCOUNTER — TELEPHONE (OUTPATIENT)
Dept: GASTROENTEROLOGY | Facility: CLINIC | Age: 74
End: 2024-09-26

## 2024-09-26 NOTE — TELEPHONE ENCOUNTER
Isiah Cerda,    Patient called in and would like her results on stool test. Her best call back number is [phone].

## 2024-10-07 PROBLEM — I38 HEART VALVE REGURGITATION: Status: ACTIVE | Noted: 2024-10-07

## 2024-10-11 ENCOUNTER — APPOINTMENT (OUTPATIENT)
Dept: PRIMARY CARE | Facility: CLINIC | Age: 74
End: 2024-10-11
Payer: MEDICARE

## 2024-10-14 ENCOUNTER — APPOINTMENT (OUTPATIENT)
Dept: PRIMARY CARE | Facility: CLINIC | Age: 74
End: 2024-10-14
Payer: MEDICARE

## 2024-10-29 ENCOUNTER — OFFICE VISIT (OUTPATIENT)
Dept: PRIMARY CARE | Facility: CLINIC | Age: 74
End: 2024-10-29
Payer: MEDICARE

## 2024-10-29 ENCOUNTER — LAB (OUTPATIENT)
Dept: LAB | Facility: LAB | Age: 74
End: 2024-10-29
Payer: MEDICARE

## 2024-10-29 VITALS
OXYGEN SATURATION: 96 % | HEART RATE: 69 BPM | HEIGHT: 61 IN | SYSTOLIC BLOOD PRESSURE: 130 MMHG | BODY MASS INDEX: 25.34 KG/M2 | WEIGHT: 134.2 LBS | DIASTOLIC BLOOD PRESSURE: 74 MMHG

## 2024-10-29 DIAGNOSIS — N64.4 BREAST PAIN, LEFT: ICD-10-CM

## 2024-10-29 DIAGNOSIS — E83.52 HYPERCALCEMIA: Primary | ICD-10-CM

## 2024-10-29 DIAGNOSIS — E78.5 HYPERLIPIDEMIA, UNSPECIFIED HYPERLIPIDEMIA TYPE: ICD-10-CM

## 2024-10-29 DIAGNOSIS — Z23 NEED FOR INFLUENZA VACCINATION: ICD-10-CM

## 2024-10-29 DIAGNOSIS — J44.9 CHRONIC OBSTRUCTIVE PULMONARY DISEASE, UNSPECIFIED COPD TYPE (MULTI): ICD-10-CM

## 2024-10-29 DIAGNOSIS — E78.5 HYPERLIPIDEMIA, UNSPECIFIED HYPERLIPIDEMIA TYPE: Primary | ICD-10-CM

## 2024-10-29 DIAGNOSIS — F41.9 ANXIETY: ICD-10-CM

## 2024-10-29 DIAGNOSIS — Z23 NEED FOR VACCINATION FOR STREP PNEUMONIAE: ICD-10-CM

## 2024-10-29 LAB
ALBUMIN SERPL BCP-MCNC: 4.8 G/DL (ref 3.4–5)
ALP SERPL-CCNC: 58 U/L (ref 33–136)
ALT SERPL W P-5'-P-CCNC: 17 U/L (ref 7–45)
ANION GAP SERPL CALC-SCNC: 10 MMOL/L (ref 10–20)
AST SERPL W P-5'-P-CCNC: 23 U/L (ref 9–39)
BILIRUB SERPL-MCNC: 0.6 MG/DL (ref 0–1.2)
BUN SERPL-MCNC: 22 MG/DL (ref 6–23)
CALCIUM SERPL-MCNC: 10.6 MG/DL (ref 8.6–10.3)
CHLORIDE SERPL-SCNC: 100 MMOL/L (ref 98–107)
CHOLEST SERPL-MCNC: 137 MG/DL (ref 0–199)
CHOLESTEROL/HDL RATIO: 2.8
CO2 SERPL-SCNC: 31 MMOL/L (ref 21–32)
CREAT SERPL-MCNC: 0.89 MG/DL (ref 0.5–1.05)
EGFRCR SERPLBLD CKD-EPI 2021: 69 ML/MIN/1.73M*2
GLUCOSE SERPL-MCNC: 88 MG/DL (ref 74–99)
HDLC SERPL-MCNC: 48.5 MG/DL
LDLC SERPL CALC-MCNC: 63 MG/DL
NON HDL CHOLESTEROL: 89 MG/DL (ref 0–149)
POTASSIUM SERPL-SCNC: 4.6 MMOL/L (ref 3.5–5.3)
PROT SERPL-MCNC: 7.5 G/DL (ref 6.4–8.2)
SODIUM SERPL-SCNC: 136 MMOL/L (ref 136–145)
TRIGL SERPL-MCNC: 130 MG/DL (ref 0–149)
VLDL: 26 MG/DL (ref 0–40)

## 2024-10-29 PROCEDURE — 99204 OFFICE O/P NEW MOD 45 MIN: CPT | Performed by: FAMILY MEDICINE

## 2024-10-29 PROCEDURE — 1125F AMNT PAIN NOTED PAIN PRSNT: CPT | Performed by: FAMILY MEDICINE

## 2024-10-29 PROCEDURE — 1159F MED LIST DOCD IN RCRD: CPT | Performed by: FAMILY MEDICINE

## 2024-10-29 PROCEDURE — G0008 ADMIN INFLUENZA VIRUS VAC: HCPCS | Performed by: FAMILY MEDICINE

## 2024-10-29 PROCEDURE — 1036F TOBACCO NON-USER: CPT | Performed by: FAMILY MEDICINE

## 2024-10-29 PROCEDURE — 99214 OFFICE O/P EST MOD 30 MIN: CPT | Mod: 25 | Performed by: FAMILY MEDICINE

## 2024-10-29 PROCEDURE — 3078F DIAST BP <80 MM HG: CPT | Performed by: FAMILY MEDICINE

## 2024-10-29 PROCEDURE — 90677 PCV20 VACCINE IM: CPT | Performed by: FAMILY MEDICINE

## 2024-10-29 PROCEDURE — 3075F SYST BP GE 130 - 139MM HG: CPT | Performed by: FAMILY MEDICINE

## 2024-10-29 PROCEDURE — 36415 COLL VENOUS BLD VENIPUNCTURE: CPT

## 2024-10-29 PROCEDURE — 1160F RVW MEDS BY RX/DR IN RCRD: CPT | Performed by: FAMILY MEDICINE

## 2024-10-29 PROCEDURE — 3008F BODY MASS INDEX DOCD: CPT | Performed by: FAMILY MEDICINE

## 2024-10-29 RX ORDER — FLUOXETINE 20 MG/1
20 TABLET ORAL DAILY
Qty: 90 TABLET | Refills: 0 | Status: SHIPPED | OUTPATIENT
Start: 2024-10-29 | End: 2025-01-27

## 2024-10-29 RX ORDER — ALBUTEROL SULFATE 90 UG/1
2 INHALANT RESPIRATORY (INHALATION) EVERY 6 HOURS PRN
Qty: 18 G | Refills: 0 | Status: SHIPPED | OUTPATIENT
Start: 2024-10-29 | End: 2025-10-29

## 2024-10-29 ASSESSMENT — PATIENT HEALTH QUESTIONNAIRE - PHQ9
1. LITTLE INTEREST OR PLEASURE IN DOING THINGS: NOT AT ALL
2. FEELING DOWN, DEPRESSED OR HOPELESS: NOT AT ALL
SUM OF ALL RESPONSES TO PHQ9 QUESTIONS 1 AND 2: 0

## 2024-10-29 ASSESSMENT — PAIN SCALES - GENERAL: PAINLEVEL_OUTOF10: 2

## 2024-10-30 ENCOUNTER — APPOINTMENT (OUTPATIENT)
Dept: DERMATOLOGY | Facility: CLINIC | Age: 74
End: 2024-10-30
Payer: MEDICARE

## 2024-10-30 DIAGNOSIS — Z85.828 HISTORY OF NONMELANOMA SKIN CANCER: ICD-10-CM

## 2024-10-30 DIAGNOSIS — D18.01 ANGIOMA OF SKIN: ICD-10-CM

## 2024-10-30 DIAGNOSIS — L81.4 LENTIGO: ICD-10-CM

## 2024-10-30 DIAGNOSIS — L30.9 DERMATITIS: ICD-10-CM

## 2024-10-30 DIAGNOSIS — L90.5 SCAR CONDITIONS AND FIBROSIS OF SKIN: ICD-10-CM

## 2024-10-30 DIAGNOSIS — L82.0 INFLAMED SEBORRHEIC KERATOSIS: Primary | ICD-10-CM

## 2024-10-30 DIAGNOSIS — D22.9 BENIGN NEVUS: ICD-10-CM

## 2024-10-30 DIAGNOSIS — L82.1 SEBORRHEIC KERATOSIS: ICD-10-CM

## 2024-10-30 DIAGNOSIS — L57.9 SKIN CHANGES DUE TO CHRONIC EXPOSURE TO NONIONIZING RADIATION: ICD-10-CM

## 2024-10-30 PROCEDURE — 1160F RVW MEDS BY RX/DR IN RCRD: CPT | Performed by: NURSE PRACTITIONER

## 2024-10-30 PROCEDURE — 1036F TOBACCO NON-USER: CPT | Performed by: NURSE PRACTITIONER

## 2024-10-30 PROCEDURE — 1159F MED LIST DOCD IN RCRD: CPT | Performed by: NURSE PRACTITIONER

## 2024-10-30 PROCEDURE — 17110 DESTRUCTION B9 LES UP TO 14: CPT | Performed by: NURSE PRACTITIONER

## 2024-10-30 PROCEDURE — 99213 OFFICE O/P EST LOW 20 MIN: CPT | Performed by: NURSE PRACTITIONER

## 2024-11-04 ENCOUNTER — HOSPITAL ENCOUNTER (EMERGENCY)
Facility: HOSPITAL | Age: 74
Discharge: HOME | End: 2024-11-04
Attending: EMERGENCY MEDICINE
Payer: MEDICARE

## 2024-11-04 ENCOUNTER — HOSPITAL ENCOUNTER (EMERGENCY)
Dept: RADIOLOGY | Facility: HOSPITAL | Age: 74
Discharge: HOME | End: 2024-11-04
Payer: MEDICARE

## 2024-11-04 VITALS
BODY MASS INDEX: 25.11 KG/M2 | SYSTOLIC BLOOD PRESSURE: 135 MMHG | RESPIRATION RATE: 18 BRPM | OXYGEN SATURATION: 96 % | HEART RATE: 73 BPM | TEMPERATURE: 97.2 F | WEIGHT: 133 LBS | DIASTOLIC BLOOD PRESSURE: 79 MMHG | HEIGHT: 61 IN

## 2024-11-04 DIAGNOSIS — N64.4 MASTODYNIA: ICD-10-CM

## 2024-11-04 DIAGNOSIS — N64.4 BREAST PAIN, LEFT: ICD-10-CM

## 2024-11-04 DIAGNOSIS — N64.4 NIPPLE PAIN: Primary | ICD-10-CM

## 2024-11-04 PROCEDURE — 77065 DX MAMMO INCL CAD UNI: CPT | Mod: LEFT SIDE | Performed by: RADIOLOGY

## 2024-11-04 PROCEDURE — 99283 EMERGENCY DEPT VISIT LOW MDM: CPT

## 2024-11-04 PROCEDURE — 77061 BREAST TOMOSYNTHESIS UNI: CPT | Mod: LT

## 2024-11-04 PROCEDURE — G0279 TOMOSYNTHESIS, MAMMO: HCPCS | Mod: LEFT SIDE | Performed by: RADIOLOGY

## 2024-11-04 PROCEDURE — 76642 ULTRASOUND BREAST LIMITED: CPT | Mod: LEFT SIDE | Performed by: RADIOLOGY

## 2024-11-04 PROCEDURE — 76642 ULTRASOUND BREAST LIMITED: CPT | Mod: LT

## 2024-11-04 RX ORDER — IBUPROFEN 600 MG/1
600 TABLET ORAL EVERY 8 HOURS PRN
Qty: 30 TABLET | Refills: 0 | Status: SHIPPED | OUTPATIENT
Start: 2024-11-04

## 2024-11-04 RX ORDER — HYDROCODONE BITARTRATE AND ACETAMINOPHEN 5; 325 MG/1; MG/1
1 TABLET ORAL 3 TIMES DAILY
Qty: 6 TABLET | Refills: 0 | Status: SHIPPED | OUTPATIENT
Start: 2024-11-04 | End: 2024-11-07

## 2024-11-04 ASSESSMENT — PAIN - FUNCTIONAL ASSESSMENT: PAIN_FUNCTIONAL_ASSESSMENT: 0-10

## 2024-11-04 ASSESSMENT — PAIN SCALES - GENERAL: PAINLEVEL_OUTOF10: 6

## 2024-11-04 ASSESSMENT — COLUMBIA-SUICIDE SEVERITY RATING SCALE - C-SSRS
1. IN THE PAST MONTH, HAVE YOU WISHED YOU WERE DEAD OR WISHED YOU COULD GO TO SLEEP AND NOT WAKE UP?: NO
6. HAVE YOU EVER DONE ANYTHING, STARTED TO DO ANYTHING, OR PREPARED TO DO ANYTHING TO END YOUR LIFE?: NO
2. HAVE YOU ACTUALLY HAD ANY THOUGHTS OF KILLING YOURSELF?: NO

## 2024-11-04 ASSESSMENT — PAIN DESCRIPTION - LOCATION: LOCATION: BREAST

## 2024-11-04 ASSESSMENT — PAIN DESCRIPTION - ORIENTATION: ORIENTATION: LEFT

## 2024-11-04 ASSESSMENT — PAIN DESCRIPTION - DESCRIPTORS: DESCRIPTORS: BURNING

## 2024-11-04 NOTE — ED PROVIDER NOTES
HPI   Chief Complaint   Patient presents with    Breast Pain     Patient reports left breast pain she saw her PCP on 10/29/24 and she got an order for a mammogram but she can't get one till 11/18. Pt reports the nipple is painful and it radiates to her left axillary area- burning sensation.  Denies any lumps or discharge.        HPI        Patient History   Past Medical History:   Diagnosis Date    Allergic contact dermatitis due to plants, except food 06/20/2014    Asthma 05/01/2023    Atrial enlargement, bilateral 08/15/2023    Basal cell carcinoma of skin of left upper limb, including shoulder 08/14/2023    Benign essential hypertension 05/01/2023    Bradycardia 09/07/2024    Breast pain, left 05/01/2023    Candidiasis of skin and nail 04/07/2014    Candidal intertrigo    Chronic obstructive pulmonary disease, unspecified COPD type (Multi) 10/12/2023    Community acquired pneumonia 09/07/2024    Depression with anxiety     Dermatitis, eczematoid 05/01/2023    Diastolic dysfunction 05/01/2023    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    Hx of atrial septal defect repair 08/15/2023    Insomnia 10/27/2023    Moderate pulmonary valve insufficiency 08/15/2023    Nonrheumatic mitral valve regurgitation 08/15/2023    Mod  to  severe         Nonrheumatic tricuspid valve regurgitation 08/15/2023    Other seborrheic keratosis 08/14/2023    Pacemaker 05/01/2023    Panic disorder (episodic paroxysmal anxiety) 09/06/2018    Panic disorder without agoraphobia    Personal history of colonic polyps 10/07/2016    History of colon polyps    Pulmonary HTN (Multi) 08/15/2023    mild      Sarcoidosis 05/01/2023    Unspecified injury of lower back, subsequent encounter 08/20/2016    Injury of back, subsequent encounter    Unspecified open wound of unspecified toe(s) with damage to nail, initial encounter 06/04/2014    Traumatic avulsion of nail plate of toe     Past Surgical History:   Procedure Laterality Date    CARDIAC  PACEMAKER PLACEMENT  08/13/2013    Pacemaker Placement    CT ANGIO NECK  11/10/2020    CT NECK ANGIO W AND WO IV CONTRAST 11/10/2020 [facility]    CT HEAD ANGIO W AND WO IV CONTRAST  11/10/2020    CT HEAD ANGIO W AND WO IV CONTRAST 11/10/2020 [facility]    OTHER SURGICAL HISTORY  08/13/2013    Mitral Valve Repair    OTHER SURGICAL HISTORY  08/13/2013    Biopsy Lung Percutaneous    OTHER SURGICAL HISTORY  03/02/2021    Pacemaker insertion    TOTAL ABDOMINAL HYSTERECTOMY W/ BILATERAL SALPINGOOPHORECTOMY  08/13/2013    Total Abdominal Hysterectomy With Removal Of Both Ovaries     Family History   Problem Relation Name Age of Onset    Sarcoidosis Mother      Heart attack Father      No Known Problems Sister      No Known Problems Brother      No Known Problems Brother       Social History     Tobacco Use    Smoking status: Never    Smokeless tobacco: Never   Vaping Use    Vaping status: Never Used   Substance Use Topics    Alcohol use: Not Currently    Drug use: Never       Physical Exam   ED Triage Vitals [11/04/24 1011]   Temperature Heart Rate Respirations BP   36.2 °C (97.2 °F) 73 18 135/79      Pulse Ox Temp Source Heart Rate Source Patient Position   96 % Temporal Monitor --      BP Location FiO2 (%)     -- --       Physical Exam  Vitals and nursing note reviewed.   Constitutional:       General: She is not in acute distress.     Appearance: She is well-developed.   HENT:      Head: Normocephalic and atraumatic.   Eyes:      Conjunctiva/sclera: Conjunctivae normal.   Cardiovascular:      Rate and Rhythm: Normal rate and regular rhythm.      Heart sounds: No murmur heard.  Pulmonary:      Effort: Pulmonary effort is normal. No respiratory distress.      Breath sounds: Normal breath sounds.   Chest:       Abdominal:      Palpations: Abdomen is soft.      Tenderness: There is no abdominal tenderness.   Musculoskeletal:         General: No swelling.      Cervical back: Neck supple.   Skin:      General: Skin is warm and dry.      Capillary Refill: Capillary refill takes less than 2 seconds.   Neurological:      Mental Status: She is alert.   Psychiatric:         Mood and Affect: Mood normal.           ED Course & MDM   Diagnoses as of 11/04/24 1035   Nipple pain                 No data recorded     Phillip Coma Scale Score: 15 (11/04/24 1019 : Adri Nunez, RN)                           Medical Decision Making  74-year-old female with pain on her left nipple.  Patient reports that the pain has been there for multiple weeks that she has a appointment for a mammogram but is on 18 November.  Did a physical exam it was assisted and chaperoned by the nurse Adri.  Did not appreciate thing abnormal on exam.  Did not reach any redness tenderness or swelling.  Patient says her pain is only at her nipple that is not her chest or anywhere else.  No evidence of shingles at this time.  I was able to arrange to have her go directly from here to mammogram and get a mammogram performed.  Patient is discharged home with pain medication recommended to have her follow-up.        Procedure  Procedures     Fabian Srivastava, DO  11/04/24 1035

## 2024-11-12 DIAGNOSIS — N64.4 NIPPLE PAIN: Primary | ICD-10-CM

## 2024-11-15 ENCOUNTER — OFFICE VISIT (OUTPATIENT)
Dept: SURGERY | Facility: CLINIC | Age: 74
End: 2024-11-15
Payer: MEDICARE

## 2024-11-15 VITALS
WEIGHT: 137 LBS | TEMPERATURE: 97 F | SYSTOLIC BLOOD PRESSURE: 126 MMHG | DIASTOLIC BLOOD PRESSURE: 69 MMHG | BODY MASS INDEX: 25.86 KG/M2 | HEIGHT: 61 IN | HEART RATE: 73 BPM

## 2024-11-15 DIAGNOSIS — N64.59 INVERTED NIPPLE: ICD-10-CM

## 2024-11-15 DIAGNOSIS — R07.89 LEFT-SIDED CHEST WALL PAIN: Primary | ICD-10-CM

## 2024-11-15 DIAGNOSIS — N64.4 NIPPLE PAIN: ICD-10-CM

## 2024-11-15 PROCEDURE — 99204 OFFICE O/P NEW MOD 45 MIN: CPT | Performed by: SURGERY

## 2024-11-15 PROCEDURE — 3008F BODY MASS INDEX DOCD: CPT | Performed by: SURGERY

## 2024-11-15 PROCEDURE — 3074F SYST BP LT 130 MM HG: CPT | Performed by: SURGERY

## 2024-11-15 PROCEDURE — 1160F RVW MEDS BY RX/DR IN RCRD: CPT | Performed by: SURGERY

## 2024-11-15 PROCEDURE — 1159F MED LIST DOCD IN RCRD: CPT | Performed by: SURGERY

## 2024-11-15 PROCEDURE — 3078F DIAST BP <80 MM HG: CPT | Performed by: SURGERY

## 2024-11-15 PROCEDURE — 1036F TOBACCO NON-USER: CPT | Performed by: SURGERY

## 2024-11-15 NOTE — PROGRESS NOTES
Subjective   Patient ID: Radhika Villagomez is a 74 y.o. female who presents for bilateral nipple pain left greater than right    HPI   This the patient presented for left breast pain.  The pain has been present since about October.  It is mainly in the left breast and left axillary area.  She also has some right breast pain.  She has had a recent mammogram and ultrasound on November 4, 2024 which were unremarkable.  The patient does workout and posterior weights.  NO FH of breast or ovarian cancer, NO previous breast surgery , Menses at 14 , Menopause at 52 when she had a hysterectomy, Children NONE,  Nipple discharge NO, Breast pain YES , Birth control pill NO, Radiation NO, History of collagen vascular disease NO .        Past Medical History:   Diagnosis Date    Allergic contact dermatitis due to plants, except food 06/20/2014    Asthma 05/01/2023    Atrial enlargement, bilateral 08/15/2023    Basal cell carcinoma of skin of left upper limb, including shoulder 08/14/2023    Benign essential hypertension 05/01/2023    Bradycardia 09/07/2024    Breast pain, left 05/01/2023    Candidiasis of skin and nail 04/07/2014    Candidal intertrigo    Chronic obstructive pulmonary disease, unspecified COPD type (Multi) 10/12/2023    Community acquired pneumonia 09/07/2024    Depression with anxiety     Dermatitis, eczematoid 05/01/2023    Diastolic dysfunction 05/01/2023    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    Hx of atrial septal defect repair 08/15/2023    Insomnia 10/27/2023    Moderate pulmonary valve insufficiency 08/15/2023    Nonrheumatic mitral valve regurgitation 08/15/2023    Mod  to  severe         Nonrheumatic tricuspid valve regurgitation 08/15/2023    Other seborrheic keratosis 08/14/2023    Pacemaker 05/01/2023    Panic disorder (episodic paroxysmal anxiety) 09/06/2018    Panic disorder without agoraphobia    Personal history of colonic polyps 10/07/2016    History of colon polyps    Pulmonary HTN (Multi)  08/15/2023    mild      Sarcoidosis 05/01/2023    Unspecified injury of lower back, subsequent encounter 08/20/2016    Injury of back, subsequent encounter    Unspecified open wound of unspecified toe(s) with damage to nail, initial encounter 06/04/2014    Traumatic avulsion of nail plate of toe        Current Outpatient Medications on File Prior to Visit   Medication Sig Dispense Refill    albuterol (ProAir HFA) 90 mcg/actuation inhaler Inhale 2 puffs every 6 hours if needed for wheezing or shortness of breath. 18 g 0    bumetanide (Bumex) 1 mg tablet Take 1 tablet (1 mg) by mouth once daily.      calcium carbonate-vitamin D3 600 mg-20 mcg (800 unit) tablet Take 1 tablet by mouth once daily.      carvedilol (Coreg) 6.25 mg tablet Take 1 tablet (6.25 mg) by mouth 2 times daily (morning and late afternoon).      FLUoxetine (PROzac) 20 mg tablet Take 1 tablet (20 mg) by mouth once daily. 90 tablet 0    ibuprofen 600 mg tablet Take 1 tablet (600 mg) by mouth every 8 hours if needed for mild pain (1 - 3) or fever (temp greater than 38.0 C). 30 tablet 0    rosuvastatin (Crestor) 10 mg tablet Take 1 tablet (10 mg) by mouth once daily. 90 tablet 3    spironolactone (Aldactone) 25 mg tablet Take 1 tablet (25 mg) by mouth once daily.       No current facility-administered medications on file prior to visit.        Review of Systems   Cardiovascular:  Positive for chest pain.       Vitals:    11/15/24 1002   BP: 126/69   Pulse: 73   Temp: 36.1 °C (97 °F)        Objective     Physical Exam  Vitals reviewed. Exam conducted with a chaperone present.   Constitutional:       Appearance: Normal appearance.   HENT:      Head: Normocephalic.   Cardiovascular:      Rate and Rhythm: Normal rate and regular rhythm.      Heart sounds: Normal heart sounds.   Pulmonary:      Effort: Pulmonary effort is normal.      Breath sounds: Normal breath sounds.   Chest:   Breasts:     Right: Inverted nipple present.      Left: Inverted nipple  present.      Comments: Chest wall pain to deep palpation left side.    Left lateral chest wall pacemaker  Abdominal:      General: Abdomen is flat.      Palpations: Abdomen is soft. There is no mass.      Tenderness: There is no abdominal tenderness. There is no guarding.   Musculoskeletal:         General: Normal range of motion.   Lymphadenopathy:      Upper Body:      Right upper body: No axillary adenopathy.      Left upper body: No axillary adenopathy.   Skin:     General: Skin is warm.   Neurological:      General: No focal deficit present.   Psychiatric:         Mood and Affect: Mood normal.     Review of imaging 11/4/2024  IMPRESSION:  No mammographic or targeted sonographic evidence of malignancy.    Problem List Items Addressed This Visit       Left-sided chest wall pain - Primary    Inverted nipple     Other Visit Diagnoses       Nipple pain                 Assessment/Plan   No obvious breast pathology.  Chest wall pain likely causing her breast pain.  Pacemaker may be causing some of the numbness and paresthesia in the left upper arm.  Plan-follow-up as needed.  No indication intervention at this time.  Discussed considering heating pads, topical Voltaren gel to the area of tenderness and regular Tylenol for 2 to 3 weeks until pain is resolved.   Will follow as needed.  If symptoms do not resolve in 6 to 8 weeks then contact my office to follow-up again.  Inderjit Nathan MD

## 2024-11-15 NOTE — PATIENT INSTRUCTIONS
I do not see any obvious abnormality in your left breast.  You do have chest wall pain as discussed and pointed out to you.  I have reviewed all of your imaging which is known.  I would try heating pad to the left chest wall area.  You can also try topical Voltaren gel that you can obtain at a pharmacy and apply to the area of tenderness in your chest wall.  I would also recommend taking Tylenol 5 mg 3 times a day.  You can take this as long as needed.  If your symptoms do not improve in 8 weeks contact my office otherwise I will see you as needed.  Continue to obtain your yearly screening mammograms

## 2024-11-16 ENCOUNTER — APPOINTMENT (OUTPATIENT)
Dept: RADIOLOGY | Facility: HOSPITAL | Age: 74
End: 2024-11-16
Payer: MEDICARE

## 2024-11-16 ENCOUNTER — APPOINTMENT (OUTPATIENT)
Dept: CARDIOLOGY | Facility: HOSPITAL | Age: 74
End: 2024-11-16
Payer: MEDICARE

## 2024-11-16 ENCOUNTER — HOSPITAL ENCOUNTER (EMERGENCY)
Facility: HOSPITAL | Age: 74
Discharge: ANOTHER HOSPITAL | End: 2024-11-17
Attending: EMERGENCY MEDICINE
Payer: MEDICARE

## 2024-11-16 DIAGNOSIS — R07.2 PRECORDIAL PAIN: Primary | ICD-10-CM

## 2024-11-16 DIAGNOSIS — R79.89 TROPONIN LEVEL ELEVATED: ICD-10-CM

## 2024-11-16 LAB
ALBUMIN SERPL BCP-MCNC: 4.8 G/DL (ref 3.4–5)
ALP SERPL-CCNC: 53 U/L (ref 33–136)
ALT SERPL W P-5'-P-CCNC: 17 U/L (ref 7–45)
ANION GAP SERPL CALC-SCNC: 12 MMOL/L (ref 10–20)
AST SERPL W P-5'-P-CCNC: 23 U/L (ref 9–39)
BASOPHILS # BLD AUTO: 0.05 X10*3/UL (ref 0–0.1)
BASOPHILS NFR BLD AUTO: 0.7 %
BILIRUB SERPL-MCNC: 0.7 MG/DL (ref 0–1.2)
BUN SERPL-MCNC: 25 MG/DL (ref 6–23)
CALCIUM SERPL-MCNC: 10 MG/DL (ref 8.6–10.3)
CARDIAC TROPONIN I PNL SERPL HS: 19 NG/L (ref 0–13)
CARDIAC TROPONIN I PNL SERPL HS: 43 NG/L (ref 0–13)
CARDIAC TROPONIN I PNL SERPL HS: 8 NG/L (ref 0–13)
CHLORIDE SERPL-SCNC: 97 MMOL/L (ref 98–107)
CO2 SERPL-SCNC: 31 MMOL/L (ref 21–32)
CREAT SERPL-MCNC: 0.95 MG/DL (ref 0.5–1.05)
D DIMER PPP FEU-MCNC: 412 NG/ML FEU
EGFRCR SERPLBLD CKD-EPI 2021: 64 ML/MIN/1.73M*2
EOSINOPHIL # BLD AUTO: 0.27 X10*3/UL (ref 0–0.4)
EOSINOPHIL NFR BLD AUTO: 3.8 %
ERYTHROCYTE [DISTWIDTH] IN BLOOD BY AUTOMATED COUNT: 11.9 % (ref 11.5–14.5)
GLUCOSE SERPL-MCNC: 109 MG/DL (ref 74–99)
HCT VFR BLD AUTO: 39.3 % (ref 36–46)
HGB BLD-MCNC: 13.6 G/DL (ref 12–16)
IMM GRANULOCYTES # BLD AUTO: 0.03 X10*3/UL (ref 0–0.5)
IMM GRANULOCYTES NFR BLD AUTO: 0.4 % (ref 0–0.9)
LYMPHOCYTES # BLD AUTO: 1.28 X10*3/UL (ref 0.8–3)
LYMPHOCYTES NFR BLD AUTO: 17.9 %
MAGNESIUM SERPL-MCNC: 2.05 MG/DL (ref 1.6–2.4)
MCH RBC QN AUTO: 34.6 PG (ref 26–34)
MCHC RBC AUTO-ENTMCNC: 34.6 G/DL (ref 32–36)
MCV RBC AUTO: 100 FL (ref 80–100)
MONOCYTES # BLD AUTO: 0.72 X10*3/UL (ref 0.05–0.8)
MONOCYTES NFR BLD AUTO: 10 %
NEUTROPHILS # BLD AUTO: 4.82 X10*3/UL (ref 1.6–5.5)
NEUTROPHILS NFR BLD AUTO: 67.2 %
NRBC BLD-RTO: 0 /100 WBCS (ref 0–0)
PLATELET # BLD AUTO: 229 X10*3/UL (ref 150–450)
POTASSIUM SERPL-SCNC: 3.6 MMOL/L (ref 3.5–5.3)
PROT SERPL-MCNC: 7.8 G/DL (ref 6.4–8.2)
RBC # BLD AUTO: 3.93 X10*6/UL (ref 4–5.2)
SODIUM SERPL-SCNC: 136 MMOL/L (ref 136–145)
WBC # BLD AUTO: 7.2 X10*3/UL (ref 4.4–11.3)

## 2024-11-16 PROCEDURE — 36415 COLL VENOUS BLD VENIPUNCTURE: CPT | Performed by: EMERGENCY MEDICINE

## 2024-11-16 PROCEDURE — 83735 ASSAY OF MAGNESIUM: CPT | Performed by: EMERGENCY MEDICINE

## 2024-11-16 PROCEDURE — 84484 ASSAY OF TROPONIN QUANT: CPT | Performed by: EMERGENCY MEDICINE

## 2024-11-16 PROCEDURE — 2500000004 HC RX 250 GENERAL PHARMACY W/ HCPCS (ALT 636 FOR OP/ED): Mod: TB

## 2024-11-16 PROCEDURE — 2500000002 HC RX 250 W HCPCS SELF ADMINISTERED DRUGS (ALT 637 FOR MEDICARE OP, ALT 636 FOR OP/ED): Performed by: EMERGENCY MEDICINE

## 2024-11-16 PROCEDURE — 71045 X-RAY EXAM CHEST 1 VIEW: CPT | Performed by: STUDENT IN AN ORGANIZED HEALTH CARE EDUCATION/TRAINING PROGRAM

## 2024-11-16 PROCEDURE — 80053 COMPREHEN METABOLIC PANEL: CPT | Performed by: EMERGENCY MEDICINE

## 2024-11-16 PROCEDURE — 2550000001 HC RX 255 CONTRASTS: Performed by: EMERGENCY MEDICINE

## 2024-11-16 PROCEDURE — 71275 CT ANGIOGRAPHY CHEST: CPT

## 2024-11-16 PROCEDURE — 85652 RBC SED RATE AUTOMATED: CPT | Performed by: INTERNAL MEDICINE

## 2024-11-16 PROCEDURE — 71275 CT ANGIOGRAPHY CHEST: CPT | Performed by: RADIOLOGY

## 2024-11-16 PROCEDURE — 85025 COMPLETE CBC W/AUTO DIFF WBC: CPT | Performed by: EMERGENCY MEDICINE

## 2024-11-16 PROCEDURE — 96375 TX/PRO/DX INJ NEW DRUG ADDON: CPT | Mod: 59

## 2024-11-16 PROCEDURE — 93005 ELECTROCARDIOGRAM TRACING: CPT

## 2024-11-16 PROCEDURE — 2500000001 HC RX 250 WO HCPCS SELF ADMINISTERED DRUGS (ALT 637 FOR MEDICARE OP)

## 2024-11-16 PROCEDURE — 99285 EMERGENCY DEPT VISIT HI MDM: CPT | Mod: 25

## 2024-11-16 PROCEDURE — 2500000001 HC RX 250 WO HCPCS SELF ADMINISTERED DRUGS (ALT 637 FOR MEDICARE OP): Performed by: EMERGENCY MEDICINE

## 2024-11-16 PROCEDURE — 71045 X-RAY EXAM CHEST 1 VIEW: CPT

## 2024-11-16 PROCEDURE — 83880 ASSAY OF NATRIURETIC PEPTIDE: CPT | Performed by: INTERNAL MEDICINE

## 2024-11-16 PROCEDURE — 85379 FIBRIN DEGRADATION QUANT: CPT | Performed by: EMERGENCY MEDICINE

## 2024-11-16 RX ORDER — NAPROXEN SODIUM 220 MG/1
324 TABLET, FILM COATED ORAL ONCE
Status: COMPLETED | OUTPATIENT
Start: 2024-11-16 | End: 2024-11-16

## 2024-11-16 RX ORDER — ONDANSETRON HYDROCHLORIDE 2 MG/ML
INJECTION, SOLUTION INTRAVENOUS
Status: COMPLETED
Start: 2024-11-16 | End: 2024-11-16

## 2024-11-16 RX ORDER — ONDANSETRON HYDROCHLORIDE 2 MG/ML
4 INJECTION, SOLUTION INTRAVENOUS ONCE
Status: COMPLETED | OUTPATIENT
Start: 2024-11-16 | End: 2024-11-16

## 2024-11-16 RX ORDER — NITROGLYCERIN 0.4 MG/1
0.4 TABLET SUBLINGUAL ONCE
Status: COMPLETED | OUTPATIENT
Start: 2024-11-16 | End: 2024-11-16

## 2024-11-16 RX ORDER — MORPHINE SULFATE 4 MG/ML
4 INJECTION, SOLUTION INTRAMUSCULAR; INTRAVENOUS ONCE
Status: DISCONTINUED | OUTPATIENT
Start: 2024-11-16 | End: 2024-11-16

## 2024-11-16 RX ORDER — KETOROLAC TROMETHAMINE 10 MG/1
10 TABLET, FILM COATED ORAL ONCE
Status: DISCONTINUED | OUTPATIENT
Start: 2024-11-16 | End: 2024-11-16

## 2024-11-16 RX ORDER — NITROGLYCERIN 0.4 MG/1
TABLET SUBLINGUAL
Status: COMPLETED
Start: 2024-11-16 | End: 2024-11-16

## 2024-11-16 RX ORDER — MORPHINE SULFATE 4 MG/ML
INJECTION, SOLUTION INTRAMUSCULAR; INTRAVENOUS
Status: COMPLETED
Start: 2024-11-16 | End: 2024-11-16

## 2024-11-16 RX ORDER — MORPHINE SULFATE 4 MG/ML
2 INJECTION, SOLUTION INTRAMUSCULAR; INTRAVENOUS ONCE
Status: COMPLETED | OUTPATIENT
Start: 2024-11-16 | End: 2024-11-16

## 2024-11-16 ASSESSMENT — PAIN SCALES - GENERAL
PAINLEVEL_OUTOF10: 8
PAINLEVEL_OUTOF10: 6
PAINLEVEL_OUTOF10: 8
PAINLEVEL_OUTOF10: 1
PAINLEVEL_OUTOF10: 4
PAINLEVEL_OUTOF10: 5 - MODERATE PAIN
PAINLEVEL_OUTOF10: 9

## 2024-11-16 ASSESSMENT — PAIN DESCRIPTION - ONSET: ONSET: SUDDEN

## 2024-11-16 ASSESSMENT — PAIN DESCRIPTION - FREQUENCY: FREQUENCY: CONSTANT/CONTINUOUS

## 2024-11-16 ASSESSMENT — COLUMBIA-SUICIDE SEVERITY RATING SCALE - C-SSRS
6. HAVE YOU EVER DONE ANYTHING, STARTED TO DO ANYTHING, OR PREPARED TO DO ANYTHING TO END YOUR LIFE?: NO
2. HAVE YOU ACTUALLY HAD ANY THOUGHTS OF KILLING YOURSELF?: NO
1. IN THE PAST MONTH, HAVE YOU WISHED YOU WERE DEAD OR WISHED YOU COULD GO TO SLEEP AND NOT WAKE UP?: NO

## 2024-11-16 ASSESSMENT — PAIN DESCRIPTION - ORIENTATION: ORIENTATION: LEFT

## 2024-11-16 ASSESSMENT — PAIN - FUNCTIONAL ASSESSMENT: PAIN_FUNCTIONAL_ASSESSMENT: 0-10

## 2024-11-16 ASSESSMENT — PAIN DESCRIPTION - DESCRIPTORS: DESCRIPTORS: SHARP

## 2024-11-16 ASSESSMENT — PAIN DESCRIPTION - PAIN TYPE: TYPE: ACUTE PAIN

## 2024-11-16 ASSESSMENT — PAIN DESCRIPTION - LOCATION: LOCATION: CHEST

## 2024-11-17 ENCOUNTER — HOSPITAL ENCOUNTER (OUTPATIENT)
Facility: HOSPITAL | Age: 74
Setting detail: OBSERVATION
End: 2024-11-17
Attending: INTERNAL MEDICINE | Admitting: INTERNAL MEDICINE
Payer: MEDICARE

## 2024-11-17 ENCOUNTER — HOSPITAL ENCOUNTER (OUTPATIENT)
Dept: CARDIOLOGY | Facility: HOSPITAL | Age: 74
Discharge: HOME | End: 2024-11-17
Payer: MEDICARE

## 2024-11-17 VITALS
RESPIRATION RATE: 19 BRPM | DIASTOLIC BLOOD PRESSURE: 67 MMHG | TEMPERATURE: 97.4 F | WEIGHT: 133 LBS | OXYGEN SATURATION: 95 % | SYSTOLIC BLOOD PRESSURE: 127 MMHG | BODY MASS INDEX: 25.11 KG/M2 | HEIGHT: 61 IN | HEART RATE: 75 BPM

## 2024-11-17 VITALS
HEART RATE: 71 BPM | TEMPERATURE: 97.3 F | WEIGHT: 133 LBS | DIASTOLIC BLOOD PRESSURE: 71 MMHG | BODY MASS INDEX: 25.11 KG/M2 | SYSTOLIC BLOOD PRESSURE: 112 MMHG | OXYGEN SATURATION: 91 % | RESPIRATION RATE: 18 BRPM | HEIGHT: 61 IN

## 2024-11-17 DIAGNOSIS — J18.9 PNEUMONIA DUE TO INFECTIOUS ORGANISM, UNSPECIFIED LATERALITY, UNSPECIFIED PART OF LUNG: ICD-10-CM

## 2024-11-17 DIAGNOSIS — J18.9 COMMUNITY ACQUIRED PNEUMONIA, UNSPECIFIED LATERALITY: ICD-10-CM

## 2024-11-17 DIAGNOSIS — R07.89 LEFT-SIDED CHEST WALL PAIN: Primary | ICD-10-CM

## 2024-11-17 LAB
ALBUMIN SERPL BCP-MCNC: 4 G/DL (ref 3.4–5)
ANION GAP SERPL CALC-SCNC: 12 MMOL/L (ref 10–20)
BNP SERPL-MCNC: 400 PG/ML (ref 0–99)
BUN SERPL-MCNC: 24 MG/DL (ref 6–23)
CALCIUM SERPL-MCNC: 8.8 MG/DL (ref 8.6–10.3)
CARDIAC TROPONIN I PNL SERPL HS: 16 NG/L (ref 0–13)
CARDIAC TROPONIN I PNL SERPL HS: 30 NG/L (ref 0–13)
CARDIAC TROPONIN I PNL SERPL HS: 48 NG/L (ref 0–13)
CHLORIDE SERPL-SCNC: 101 MMOL/L (ref 98–107)
CO2 SERPL-SCNC: 26 MMOL/L (ref 21–32)
CREAT SERPL-MCNC: 0.9 MG/DL (ref 0.5–1.05)
CRP SERPL-MCNC: 0.26 MG/DL
EGFRCR SERPLBLD CKD-EPI 2021: 68 ML/MIN/1.73M*2
ERYTHROCYTE [SEDIMENTATION RATE] IN BLOOD BY WESTERGREN METHOD: 8 MM/H (ref 0–30)
GLUCOSE SERPL-MCNC: 84 MG/DL (ref 74–99)
MAGNESIUM SERPL-MCNC: 1.89 MG/DL (ref 1.6–2.4)
PHOSPHATE SERPL-MCNC: 3.2 MG/DL (ref 2.5–4.9)
POTASSIUM SERPL-SCNC: 4.1 MMOL/L (ref 3.5–5.3)
SODIUM SERPL-SCNC: 135 MMOL/L (ref 136–145)

## 2024-11-17 PROCEDURE — 2500000005 HC RX 250 GENERAL PHARMACY W/O HCPCS: Performed by: EMERGENCY MEDICINE

## 2024-11-17 PROCEDURE — 2500000002 HC RX 250 W HCPCS SELF ADMINISTERED DRUGS (ALT 637 FOR MEDICARE OP, ALT 636 FOR OP/ED): Mod: MUE

## 2024-11-17 PROCEDURE — 36415 COLL VENOUS BLD VENIPUNCTURE: CPT | Performed by: EMERGENCY MEDICINE

## 2024-11-17 PROCEDURE — 2500000004 HC RX 250 GENERAL PHARMACY W/ HCPCS (ALT 636 FOR OP/ED)

## 2024-11-17 PROCEDURE — 80069 RENAL FUNCTION PANEL: CPT

## 2024-11-17 PROCEDURE — G0378 HOSPITAL OBSERVATION PER HR: HCPCS

## 2024-11-17 PROCEDURE — 2500000001 HC RX 250 WO HCPCS SELF ADMINISTERED DRUGS (ALT 637 FOR MEDICARE OP)

## 2024-11-17 PROCEDURE — 2500000004 HC RX 250 GENERAL PHARMACY W/ HCPCS (ALT 636 FOR OP/ED): Performed by: EMERGENCY MEDICINE

## 2024-11-17 PROCEDURE — 87040 BLOOD CULTURE FOR BACTERIA: CPT | Mod: GENLAB | Performed by: EMERGENCY MEDICINE

## 2024-11-17 PROCEDURE — 94760 N-INVAS EAR/PLS OXIMETRY 1: CPT

## 2024-11-17 PROCEDURE — 96365 THER/PROPH/DIAG IV INF INIT: CPT

## 2024-11-17 PROCEDURE — 96372 THER/PROPH/DIAG INJ SC/IM: CPT

## 2024-11-17 PROCEDURE — 84484 ASSAY OF TROPONIN QUANT: CPT | Performed by: EMERGENCY MEDICINE

## 2024-11-17 PROCEDURE — 93005 ELECTROCARDIOGRAM TRACING: CPT

## 2024-11-17 PROCEDURE — 96374 THER/PROPH/DIAG INJ IV PUSH: CPT | Mod: 59

## 2024-11-17 PROCEDURE — 84484 ASSAY OF TROPONIN QUANT: CPT

## 2024-11-17 PROCEDURE — 96375 TX/PRO/DX INJ NEW DRUG ADDON: CPT | Mod: 59

## 2024-11-17 PROCEDURE — 99223 1ST HOSP IP/OBS HIGH 75: CPT

## 2024-11-17 PROCEDURE — 86140 C-REACTIVE PROTEIN: CPT | Performed by: INTERNAL MEDICINE

## 2024-11-17 PROCEDURE — 83735 ASSAY OF MAGNESIUM: CPT

## 2024-11-17 RX ORDER — AZITHROMYCIN 500 MG/1
500 TABLET, FILM COATED ORAL
Status: DISCONTINUED | OUTPATIENT
Start: 2024-11-17 | End: 2024-11-18 | Stop reason: HOSPADM

## 2024-11-17 RX ORDER — ALBUTEROL SULFATE 90 UG/1
2 INHALANT RESPIRATORY (INHALATION) EVERY 6 HOURS PRN
Status: DISCONTINUED | OUTPATIENT
Start: 2024-11-17 | End: 2024-11-18 | Stop reason: HOSPADM

## 2024-11-17 RX ORDER — CEFTRIAXONE 2 G/50ML
2 INJECTION, SOLUTION INTRAVENOUS EVERY 24 HOURS
Status: DISCONTINUED | OUTPATIENT
Start: 2024-11-17 | End: 2024-11-17 | Stop reason: HOSPADM

## 2024-11-17 RX ORDER — IBUPROFEN 600 MG/1
600 TABLET ORAL EVERY 8 HOURS PRN
Status: DISCONTINUED | OUTPATIENT
Start: 2024-11-17 | End: 2024-11-18 | Stop reason: HOSPADM

## 2024-11-17 RX ORDER — KETOROLAC TROMETHAMINE 15 MG/ML
15 INJECTION, SOLUTION INTRAMUSCULAR; INTRAVENOUS ONCE
Status: COMPLETED | OUTPATIENT
Start: 2024-11-17 | End: 2024-11-17

## 2024-11-17 RX ORDER — AMOXICILLIN AND CLAVULANATE POTASSIUM 875; 125 MG/1; MG/1
1 TABLET, FILM COATED ORAL EVERY 12 HOURS SCHEDULED
Status: DISCONTINUED | OUTPATIENT
Start: 2024-11-17 | End: 2024-11-18 | Stop reason: HOSPADM

## 2024-11-17 RX ORDER — ENOXAPARIN SODIUM 100 MG/ML
40 INJECTION SUBCUTANEOUS EVERY 24 HOURS
Status: DISCONTINUED | OUTPATIENT
Start: 2024-11-17 | End: 2024-11-18 | Stop reason: HOSPADM

## 2024-11-17 RX ORDER — AZITHROMYCIN 100 MG/ML
INJECTION, POWDER, LYOPHILIZED, FOR SOLUTION INTRAVENOUS
Status: COMPLETED
Start: 2024-11-17 | End: 2024-11-17

## 2024-11-17 RX ORDER — BUMETANIDE 1 MG/1
1 TABLET ORAL DAILY
Status: DISCONTINUED | OUTPATIENT
Start: 2024-11-17 | End: 2024-11-17

## 2024-11-17 RX ORDER — ROSUVASTATIN CALCIUM 10 MG/1
10 TABLET, COATED ORAL DAILY
Status: DISCONTINUED | OUTPATIENT
Start: 2024-11-17 | End: 2024-11-18 | Stop reason: HOSPADM

## 2024-11-17 RX ORDER — BUMETANIDE 1 MG/1
0.5 TABLET ORAL DAILY
Status: DISCONTINUED | OUTPATIENT
Start: 2024-11-17 | End: 2024-11-18 | Stop reason: HOSPADM

## 2024-11-17 RX ORDER — CARVEDILOL 6.25 MG/1
6.25 TABLET ORAL NIGHTLY
Status: DISCONTINUED | OUTPATIENT
Start: 2024-11-17 | End: 2024-11-18 | Stop reason: HOSPADM

## 2024-11-17 RX ORDER — KETOROLAC TROMETHAMINE 15 MG/ML
INJECTION, SOLUTION INTRAMUSCULAR; INTRAVENOUS
Status: COMPLETED
Start: 2024-11-17 | End: 2024-11-17

## 2024-11-17 RX ORDER — ONDANSETRON HYDROCHLORIDE 2 MG/ML
4 INJECTION, SOLUTION INTRAVENOUS EVERY 6 HOURS PRN
Status: DISCONTINUED | OUTPATIENT
Start: 2024-11-17 | End: 2024-11-18 | Stop reason: HOSPADM

## 2024-11-17 RX ORDER — FLUOXETINE HYDROCHLORIDE 20 MG/1
20 CAPSULE ORAL DAILY
Status: DISCONTINUED | OUTPATIENT
Start: 2024-11-17 | End: 2024-11-18 | Stop reason: HOSPADM

## 2024-11-17 RX ADMIN — BUMETANIDE 0.5 MG: 1 TABLET ORAL at 14:05

## 2024-11-17 RX ADMIN — ENOXAPARIN SODIUM 40 MG: 40 INJECTION SUBCUTANEOUS at 13:16

## 2024-11-17 RX ADMIN — AZITHROMYCIN DIHYDRATE 500 MG: 500 TABLET ORAL at 13:19

## 2024-11-17 RX ADMIN — FLUOXETINE HYDROCHLORIDE 20 MG: 20 CAPSULE ORAL at 13:16

## 2024-11-17 RX ADMIN — AMOXICILLIN AND CLAVULANATE POTASSIUM 1 TABLET: 875; 125 TABLET, FILM COATED ORAL at 13:16

## 2024-11-17 RX ADMIN — ROSUVASTATIN CALCIUM 10 MG: 10 TABLET, FILM COATED ORAL at 19:59

## 2024-11-17 RX ADMIN — IBUPROFEN 600 MG: 600 TABLET, FILM COATED ORAL at 16:33

## 2024-11-17 RX ADMIN — AMOXICILLIN AND CLAVULANATE POTASSIUM 1 TABLET: 875; 125 TABLET, FILM COATED ORAL at 19:58

## 2024-11-17 RX ADMIN — CARVEDILOL 6.25 MG: 6.25 TABLET, FILM COATED ORAL at 19:59

## 2024-11-17 SDOH — SOCIAL STABILITY: SOCIAL INSECURITY: DO YOU FEEL ANYONE HAS EXPLOITED OR TAKEN ADVANTAGE OF YOU FINANCIALLY OR OF YOUR PERSONAL PROPERTY?: NO

## 2024-11-17 SDOH — SOCIAL STABILITY: SOCIAL INSECURITY: ABUSE: ADULT

## 2024-11-17 SDOH — SOCIAL STABILITY: SOCIAL INSECURITY: WERE YOU ABLE TO COMPLETE ALL THE BEHAVIORAL HEALTH SCREENINGS?: YES

## 2024-11-17 SDOH — SOCIAL STABILITY: SOCIAL INSECURITY: ARE YOU OR HAVE YOU BEEN THREATENED OR ABUSED PHYSICALLY, EMOTIONALLY, OR SEXUALLY BY ANYONE?: NO

## 2024-11-17 SDOH — SOCIAL STABILITY: SOCIAL INSECURITY: ARE THERE ANY APPARENT SIGNS OF INJURIES/BEHAVIORS THAT COULD BE RELATED TO ABUSE/NEGLECT?: NO

## 2024-11-17 SDOH — SOCIAL STABILITY: SOCIAL INSECURITY: DO YOU FEEL UNSAFE GOING BACK TO THE PLACE WHERE YOU ARE LIVING?: NO

## 2024-11-17 SDOH — SOCIAL STABILITY: SOCIAL INSECURITY: DOES ANYONE TRY TO KEEP YOU FROM HAVING/CONTACTING OTHER FRIENDS OR DOING THINGS OUTSIDE YOUR HOME?: NO

## 2024-11-17 SDOH — SOCIAL STABILITY: SOCIAL INSECURITY: HAVE YOU HAD ANY THOUGHTS OF HARMING ANYONE ELSE?: NO

## 2024-11-17 SDOH — SOCIAL STABILITY: SOCIAL INSECURITY: HAVE YOU HAD THOUGHTS OF HARMING ANYONE ELSE?: NO

## 2024-11-17 SDOH — SOCIAL STABILITY: SOCIAL INSECURITY: HAS ANYONE EVER THREATENED TO HURT YOUR FAMILY OR YOUR PETS?: NO

## 2024-11-17 ASSESSMENT — LIFESTYLE VARIABLES
HOW OFTEN DO YOU HAVE 6 OR MORE DRINKS ON ONE OCCASION: NEVER
HOW MANY STANDARD DRINKS CONTAINING ALCOHOL DO YOU HAVE ON A TYPICAL DAY: PATIENT DOES NOT DRINK
HOW OFTEN DO YOU HAVE A DRINK CONTAINING ALCOHOL: NEVER
AUDIT-C TOTAL SCORE: 0
SKIP TO QUESTIONS 9-10: 1
AUDIT-C TOTAL SCORE: 0

## 2024-11-17 ASSESSMENT — ACTIVITIES OF DAILY LIVING (ADL)
HEARING - LEFT EAR: FUNCTIONAL
PATIENT'S MEMORY ADEQUATE TO SAFELY COMPLETE DAILY ACTIVITIES?: YES
GROOMING: INDEPENDENT
ADEQUATE_TO_COMPLETE_ADL: YES
TOILETING: INDEPENDENT
HEARING - RIGHT EAR: FUNCTIONAL
BATHING: INDEPENDENT
DRESSING YOURSELF: INDEPENDENT
FEEDING YOURSELF: INDEPENDENT
JUDGMENT_ADEQUATE_SAFELY_COMPLETE_DAILY_ACTIVITIES: YES
JUDGMENT_ADEQUATE_SAFELY_COMPLETE_DAILY_ACTIVITIES: YES
WALKS IN HOME: INDEPENDENT
LACK_OF_TRANSPORTATION: NO
ADEQUATE_TO_COMPLETE_ADL: YES
ASSISTIVE_DEVICE: EYEGLASSES

## 2024-11-17 ASSESSMENT — PATIENT HEALTH QUESTIONNAIRE - PHQ9
SUM OF ALL RESPONSES TO PHQ9 QUESTIONS 1 & 2: 0
1. LITTLE INTEREST OR PLEASURE IN DOING THINGS: NOT AT ALL
2. FEELING DOWN, DEPRESSED OR HOPELESS: NOT AT ALL

## 2024-11-17 ASSESSMENT — COGNITIVE AND FUNCTIONAL STATUS - GENERAL
DAILY ACTIVITIY SCORE: 24
PATIENT BASELINE BEDBOUND: NO
DAILY ACTIVITIY SCORE: 24
MOBILITY SCORE: 24
MOBILITY SCORE: 24

## 2024-11-17 ASSESSMENT — PAIN - FUNCTIONAL ASSESSMENT
PAIN_FUNCTIONAL_ASSESSMENT: 0-10

## 2024-11-17 ASSESSMENT — COLUMBIA-SUICIDE SEVERITY RATING SCALE - C-SSRS
1. IN THE PAST MONTH, HAVE YOU WISHED YOU WERE DEAD OR WISHED YOU COULD GO TO SLEEP AND NOT WAKE UP?: NO
2. HAVE YOU ACTUALLY HAD ANY THOUGHTS OF KILLING YOURSELF?: NO
6. HAVE YOU EVER DONE ANYTHING, STARTED TO DO ANYTHING, OR PREPARED TO DO ANYTHING TO END YOUR LIFE?: NO

## 2024-11-17 ASSESSMENT — PAIN SCALES - GENERAL
PAINLEVEL_OUTOF10: 1
PAINLEVEL_OUTOF10: 5 - MODERATE PAIN
PAINLEVEL_OUTOF10: 0 - NO PAIN
PAINLEVEL_OUTOF10: 0 - NO PAIN
PAINLEVEL_OUTOF10: 4

## 2024-11-17 ASSESSMENT — PAIN DESCRIPTION - LOCATION: LOCATION: CHEST

## 2024-11-17 NOTE — HOSPITAL COURSE
HPI:  Radhika Villagomez is a 74 y.o. female with a PMH of COPD, HTN, HFpEF, Asthma, Depression, Anxiety, HLD, sarcoidosis, IBS, and heart block s/p pacemaker who was transferred to [facility] from [place] with c/o chest pain and shortness of breath.     Patient stated that last night around 7pm she was sitting in her chair when she started to experience chest pain. She described this pain as sharp in the center of her chest that radiated to her left shoulder and down her left arm. She tried to walk around to see if it would make it better but the pain remained the same and she decided to go to the ED. During this time she also experienced some shortness of breath, sweating, and nausea. Patient denied any recent illness, cough, sick contacts, dysuria, vomiting, diarrhea, fever, chills, and abdominal pain. Patient stated that palpating her chest reproduced a similar pain that she was feeling earlier and that nitroglycerin in ED helped relieve some pain.     This morning she still had the chest pain and nausea, but at the time of evaluation the symptoms have resolved.     ED COURSE:  Vitals:   - /69 , HR 69 , RR 18 , SpO2 98, T 36.2C  Labs:  - WBC 7.2, HGB 13.6,   - Na 136, Cl 97, K 3.6, bicarb 31, BUN 25, Cr 0.95  - Trop: 19 -> 43 -> 48 ->30  Imaging:  - CT PE; No evidence of acute pulmonary embolism. There is consolidative opacity in the right middle lobe. There are mild groundglass nodular opacities in the right upper lobe and lower lobe. There is mild opacity in the left upper lobe. 5 mm ground glass opacity in the left lower lobe. No significant pleural effusion. No pneumothorax.  Interventions:   - Nitroglycerin, Ticagrelor 180mg, Aspirin 324mg, Zofran 4mg, Ceftriaxone 2G, Azithromycin 500mg. Ketorolac 15mg.     Upon [facility] arrival, patient was started on Augmentin and azithromycin. Dr. Hernandez, her outpatient cardiologist, saw her in the morning prior to transfer, stated that her pain is likely from  pneumonia and pleurisy rather than from a cardiac etiology.

## 2024-11-17 NOTE — CONSULTS
Consults  History Of Present Illness:    Radhika Villagomez is aAcute onset of chest pain.    About 2 weeks ago the patient began experiencing discomfort over the left breast, tenderness and right breast.  She underwent evaluation with mammogram, ultrasound which were unrevealing.  She currently denies any fever or chills blurred vision.  She denies any cough or phlegm production she does report nausea and a headache.  She has mild diaphoresis.  No orthopnea paroxysmal nocturnal dyspnea lower extremity edema.  No palpitations, near-syncope or syncope.    Last night she had severe pain across the chest radiating to the left shoulder blade and left upper extremity.  She received sublingual nitrates and morphine with reported resolution of the pain.  The pain recurred this morning and has been constant for the past few hours.  The discomfort does appear to have a pleuritic component.  There is also a reproducible component in the left lower chest.    Electrocardiogram shows electronic ventricular pacemaker.    Laboratory studies reviewed cardiac troponin was elevated at 19 and then keisha to 43 blood cultures are pending.  CBC notable for WBC 7.2 hemoglobin 13.6, no evidence of left shift.  Comprehensive panel notable for glucose 109, potassium 3.6 BUN 25 creatinine 0.95 magnesium 2.05 D-dimer normal cardiac troponin keisha further to 48 and then has declined to 30.     1.  SSS  Followed by  device clinic at   Complete heart block  Medtronic Pacemaker in situ since 1999, lead revision, Generator change in 2009 with new RV Lead insertion  RV lead failure with revision in 2015  MICRA placed 11/20/20 d/t lead fx  2.  Hypertension, optimal  3.  Sarcoidosis  4.  ASD repair  5.  Chronic diastolic heart failure, compensated  NYHA Class=II  6.  Hyperlipidemia  LDL=62(11/10/23)  7.   KATHY(6/15/21) with normal LV function, small PFO with left=>right shunt  8.   Valvular Heart Disease, asymptomatic  Echo, 6/6/23: moderate-severe  MR  KATHY revealed moderate MR, 6/21  9.   Shortness of breath has improved in the last few months, but continues, stable  10.      COVID recovered(+PCR-11/15/21)      Past Medical History:  She has a past medical history of Allergic contact dermatitis due to plants, except food (06/20/2014), Asthma (05/01/2023), Atrial enlargement, bilateral (08/15/2023), Basal cell carcinoma of skin of left upper limb, including shoulder (08/14/2023), Benign essential hypertension (05/01/2023), Bradycardia (09/07/2024), Breast pain, left (05/01/2023), Candidiasis of skin and nail (04/07/2014), Chronic obstructive pulmonary disease, unspecified COPD type (Multi) (10/12/2023), Community acquired pneumonia (09/07/2024), Depression with anxiety, Dermatitis, eczematoid (05/01/2023), Diastolic dysfunction (05/01/2023), 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), atrial septal defect repair (08/15/2023), Insomnia (10/27/2023), Moderate pulmonary valve insufficiency (08/15/2023), Nonrheumatic mitral valve regurgitation (08/15/2023), Nonrheumatic tricuspid valve regurgitation (08/15/2023), Other seborrheic keratosis (08/14/2023), Pacemaker (05/01/2023), Panic disorder (episodic paroxysmal anxiety) (09/06/2018), Personal history of colonic polyps (10/07/2016), Pulmonary HTN (Multi) (08/15/2023), Sarcoidosis (05/01/2023), Unspecified injury of lower back, subsequent encounter (08/20/2016), and Unspecified open wound of unspecified toe(s) with damage to nail, initial encounter (06/04/2014).    Past Surgical History:  She has a past surgical history that includes Other surgical history (08/13/2013); Other surgical history (08/13/2013); Cardiac pacemaker placement (08/13/2013); Total abdominal hysterectomy w/ bilateral salpingoophorectomy (08/13/2013); Other surgical history (03/02/2021); CT angio head w and wo IV contrast (11/10/2020); and CT angio neck (11/10/2020).      Social History:  She reports that she has never smoked. She has never  "used smokeless tobacco. She reports that she does not currently use alcohol. She reports that she does not use drugs.    Family History:  Family History   Problem Relation Name Age of Onset    Sarcoidosis Mother      Heart attack Father      No Known Problems Sister      No Known Problems Brother      No Known Problems Brother          Allergies:  Sulfa (sulfonamide antibiotics)    Outpatient Medications:  Current Outpatient Medications   Medication Instructions    albuterol (ProAir HFA) 90 mcg/actuation inhaler 2 puffs, inhalation, Every 6 hours PRN    bumetanide (BUMEX) 1 mg, Daily    carvedilol (COREG) 6.25 mg, Nightly    FLUoxetine (PROZAC) 20 mg, oral, Daily    ibuprofen 600 mg, oral, Every 8 hours PRN    rosuvastatin (CRESTOR) 10 mg, oral, Daily    spironolactone (ALDACTONE) 25 mg, Daily         Inpatient Medications:  PRN medications   Medication    albuterol    ibuprofen    ondansetron     Continuous Medications   Medication Dose Last Rate       Last Labs:  Results for orders placed or performed during the hospital encounter of 11/17/24 (from the past 96 hours)   Renal Function Panel   Result Value Ref Range    Glucose 84 74 - 99 mg/dL    Sodium 135 (L) 136 - 145 mmol/L    Potassium 4.1 3.5 - 5.3 mmol/L    Chloride 101 98 - 107 mmol/L    Bicarbonate 26 21 - 32 mmol/L    Anion Gap 12 10 - 20 mmol/L    Urea Nitrogen 24 (H) 6 - 23 mg/dL    Creatinine 0.90 0.50 - 1.05 mg/dL    eGFR 68 >60 mL/min/1.73m*2    Calcium 8.8 8.6 - 10.3 mg/dL    Phosphorus 3.2 2.5 - 4.9 mg/dL    Albumin 4.0 3.4 - 5.0 g/dL   Magnesium   Result Value Ref Range    Magnesium 1.89 1.60 - 2.40 mg/dL          Last Recorded Vitals:  Vitals:    11/17/24 1121   BP: 119/69   BP Location: Right arm   Patient Position: Lying   Pulse: 69   Resp: 18   Temp: 36.2 °C (97.2 °F)   TempSrc: Temporal   SpO2: 98%   Weight: 60.3 kg (133 lb)   Height: 1.549 m (5' 1\")     No intake/output data recorded.      Physical Exam:  Constitutional: Well developed, " awake/alert/oriented x3, no distress, alert and cooperative  Eyes: PERRL, EOMI, clear sclera  ENMT: mucous membranes moist, no apparent injury, no lesions seen  Head/Neck: Neck supple, no apparent injury, thyroid without mass or tenderness, No JVD, trachea midline, no bruits  Respiratory/Thorax: Patent airways, CTAB, normal breath sounds with good chest expansion, thorax symmetric  Cardiovascular: Regular, rate and rhythm, no murmurs, 2+ equal pulses of the extremities, normal S 1and S 2  Gastrointestinal: Nondistended, soft, non-tender, no rebound tenderness or guarding, no masses palpable, no organomegaly, +BS, no bruits  Musculoskeletal: ROM intact, no joint swelling, normal strength  Extremities: normal extremities, no cyanosis edema, contusions or wounds, no clubbing  Neurological: alert and oriented x3, intact senses, motor, response and reflexes, normal strength  Lymphatic: No significant lymphadenopathy  Psychological: Appropriate mood and behavior  Skin: Warm and dry, no lesions, no rashes     Assessment/Plan   Problem List Items Addressed This Visit    None  Patient has a history of complete heart block with Medtronic pacemaker in situ since 1999, generator change in 2009 with new RV lead insertion, sp RV lead failure with revision in 2015, MICRA placement, 11/27/20, hypertension, sarcoidosis, and ASD repair 25 years ago. She developed COVID pneumonia in 11/20. She underwent MICRA implant on 11/20 d/t lead fx.     Patient has been admitted to the hospital with acute chest pain, elevated cardiac troponin.  CT angiogram of the chest consistent with multifocal pneumonia in the right lung.  Chest pain atypical for acute coronary syndrome most likely related to underlying pneumonia with associated pleurisy and/or pericarditis.    Patient has chronic heart failure with preserved ejection fraction, currently no evidence of volume overload.    Agree with broad-spectrum antibiotics for community-acquired pneumonia.   Check ESR, CRP, analgesics given for pain relief.    Continue rosuvastatin for hyperlipidemia, enoxaparin for DVT prophylaxis carvedilol for cardioprotection, hypertension and loop diuretics.    Old records reviewed.      Echo 6/13/24  GENERAL COMMENT: Technically good study. Rhythm is electronically paced at 77 BPM.   LEFT VENTRICLE: Normal left ventricular wall thickness, size and systolic function. Estimated LVEF 60%. There were no segmental wall motion abnormalities.  INTERVENTRICULAR: Normal intraventricular septum.  LVOT: Slightly increased LVOT velocity contour.  RIGHT VENTRICLE: Normal right ventricular size and function. Pacemaker electrode noted.   MASSES: No intracardiac masses or thrombi were noted.  EFFUSIONS: No pericardial effusion.  LEFT ATRIUM: Moderate-severe left atrial enlargement.  RIGHT ATRIUM: Moderate-severe right enlargement. Pacemaker electrode noted.  INTERATRIAL: By history, ASD/PFO repair (1992, [facility]).   AORTIC VALVE: Structurally normal aortic root. Normal diameter proximal ascending aorta (3.1cm). Mild fibrocalcific changes of a trileaflet aortic valve. Normal aortic valve velocities. There is no aortic stenosis or aortic insufficiency.  MITRAL VALVE: Mild-moderate mitral annular calcification. Mildly thickened/calcified mitral valve leaflets. There is no prolapse. Moderate mitral regurgitation.  TRICUSPID VALVE: Normal tricuspid valve and tricuspid valve velocities. Moderate tricuspid regurgitation.  PULMONIC VALVE: Normal pulmonic valve and normal pulmonic valve velocities. Mild to moderate pulmonic insufficiency.  HEMODYNAMICS: Doppler c/w abnormal left ventricular relaxation. Moderate pulmonary hypertension. Normal estimated CVP of 5-10 mmHg.    CONCLUSIONS: See above for details. Technically good study. Normal left ventricular size and function. Estimated LVEF 60%. Bi-atrial enlargement. Moderate mitral regurgitation. Moderate tricuspid regurgitation. Abnormal left ventricular  relaxation. Moderate pulmonary hypertension. Normal estimated CVP. S/p ASD repair.       Repeat limited echocardiogram.    Case discussed with hospitalist team and nursing staff.  Peripheral IV 11/16/24 20 G Right Antecubital (Active)   Site Assessment Clean;Dry;Intact 11/17/24 1233   Number of days: 1       Peripheral IV 11/16/24 20 G Left Antecubital (Active)   Site Assessment Clean;Dry;Intact 11/17/24 1233   Number of days: 1       Code Status:  Full Code    I spent 30 minutes in the professional and overall care of this patient.        David Heath MD

## 2024-11-17 NOTE — H&P
Subjective   Subjective:   HPI:  Radhika Villagomez is a 74 y.o. female with a PMH of COPD, HTN, HFpEF, Asthma, Depression, Anxiety, HLD, sarcoidosis, IBS, and heart block s/p pacemaker who was transferred to [facility] from [place] with c/o chest pain and shortness of breath.     Patient stated that last night around 7pm she was sitting in her chair when she started to experience chest pain. She described this pain as sharp in the center of her chest that radiated to her left shoulder and down her left arm. She tried to walk around to see if it would make it better but the pain remained the same and she decided to go to the ED. During this time she also experienced some shortness of breath, sweating, and nausea. Patient denied any recent illness, cough, sick contacts, dysuria, vomiting, diarrhea, fever, chills, and abdominal pain. Patient stated that palpating her chest reproduced a similar pain that she was feeling earlier and that nitroglycerin in ED helped relieve some pain.     This morning she still had the chest pain and nausea, but at the time of evaluation the symptoms have resolved.     12 point Review of Systems negative unless stated above.    ED COURSE:  Vitals:   - /69 , HR 69 , RR 18 , SpO2 98, T 36.2C  Labs:  - WBC 7.2, HGB 13.6,   - Na 136, Cl 97, K 3.6, bicarb 31, BUN 25, Cr 0.95  - Trop: 19 -> 43 -> 48 ->30  Imaging:  - CT PE; No evidence of acute pulmonary embolism. There is consolidative opacity in the right middle lobe. There are mild groundglass nodular opacities in the right upper lobe and lower lobe. There is mild opacity in the left upper lobe. 5 mm ground glass opacity in the left lower lobe. No significant pleural effusion. No pneumothorax.  Interventions:   - Nitroglycerin, Ticagrelor 180mg, Aspirin 324mg, Zofran 4mg, Ceftriaxone 2G, Azithromycin 500mg. Ketorolac 15mg.         Code Status: Full Code    ED Course:   Vitals:  /69 (BP Location: Right arm, Patient Position: Lying)    Pulse 69   Temp 36.2 °C (97.2 °F) (Temporal)   Resp 18   Wt 60.3 kg (133 lb)   SpO2 98%     Labs:  Lab Results   Component Value Date    WBC 7.2 11/16/2024    HGB 13.6 11/16/2024    HCT 39.3 11/16/2024     11/16/2024     11/16/2024     Lab Results   Component Value Date    GLUCOSE 109 (H) 11/16/2024    CALCIUM 10.0 11/16/2024     11/16/2024    K 3.6 11/16/2024    CO2 31 11/16/2024    CL 97 (L) 11/16/2024    BUN 25 (H) 11/16/2024    CREATININE 0.95 11/16/2024     Lab Results   Component Value Date    TROPHS 30 (H) 11/17/2024     Lab Results   Component Value Date     (H) 11/15/2020     Lab Results   Component Value Date    DDIMERVTE 241 11/15/2020     Imaging:  No orders to display      Interventions:  Medications - No data to display    Past Medical History:  She has a past medical history of Allergic contact dermatitis due to plants, except food (06/20/2014), Asthma (05/01/2023), Atrial enlargement, bilateral (08/15/2023), Basal cell carcinoma of skin of left upper limb, including shoulder (08/14/2023), Benign essential hypertension (05/01/2023), Bradycardia (09/07/2024), Breast pain, left (05/01/2023), Candidiasis of skin and nail (04/07/2014), Chronic obstructive pulmonary disease, unspecified COPD type (Multi) (10/12/2023), Community acquired pneumonia (09/07/2024), Depression with anxiety, Dermatitis, eczematoid (05/01/2023), Diastolic dysfunction (05/01/2023), 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), atrial septal defect repair (08/15/2023), Insomnia (10/27/2023), Moderate pulmonary valve insufficiency (08/15/2023), Nonrheumatic mitral valve regurgitation (08/15/2023), Nonrheumatic tricuspid valve regurgitation (08/15/2023), Other seborrheic keratosis (08/14/2023), Pacemaker (05/01/2023), Panic disorder (episodic paroxysmal anxiety) (09/06/2018), Personal history of colonic polyps (10/07/2016), Pulmonary HTN (Multi) (08/15/2023), Sarcoidosis (05/01/2023), Unspecified  injury of lower back, subsequent encounter (08/20/2016), and Unspecified open wound of unspecified toe(s) with damage to nail, initial encounter (06/04/2014).    Past Surgical History:  She has a past surgical history that includes Other surgical history (08/13/2013); Other surgical history (08/13/2013); Cardiac pacemaker placement (08/13/2013); Total abdominal hysterectomy w/ bilateral salpingoophorectomy (08/13/2013); Other surgical history (03/02/2021); CT angio head w and wo IV contrast (11/10/2020); and CT angio neck (11/10/2020).    Social History:  She reports that she has never smoked. She has never used smokeless tobacco. She reports that she does not currently use alcohol. She reports that she does not use drugs.    Family History:  Family History   Problem Relation Name Age of Onset    Sarcoidosis Mother      Heart attack Father      No Known Problems Sister      No Known Problems Brother      No Known Problems Brother       Allergies:  Sulfa (sulfonamide antibiotics)    Home Medications:  Medications Prior to Admission   Medication Sig Dispense Refill Last Dose/Taking    albuterol (ProAir HFA) 90 mcg/actuation inhaler Inhale 2 puffs every 6 hours if needed for wheezing or shortness of breath. 18 g 0     bumetanide (Bumex) 1 mg tablet Take 1 tablet (1 mg) by mouth once daily.       calcium carbonate-vitamin D3 600 mg-20 mcg (800 unit) tablet Take 1 tablet by mouth once daily.       carvedilol (Coreg) 6.25 mg tablet Take 1 tablet (6.25 mg) by mouth 2 times daily (morning and late afternoon).       FLUoxetine (PROzac) 20 mg tablet Take 1 tablet (20 mg) by mouth once daily. 90 tablet 0     ibuprofen 600 mg tablet Take 1 tablet (600 mg) by mouth every 8 hours if needed for mild pain (1 - 3) or fever (temp greater than 38.0 C). 30 tablet 0     rosuvastatin (Crestor) 10 mg tablet Take 1 tablet (10 mg) by mouth once daily. 90 tablet 3     spironolactone (Aldactone) 25 mg tablet Take 1 tablet (25 mg) by mouth  "once daily.        Review Of Systems:  11-point ROS was performed and is negative except as noted below and in the HPI.          Objective   Objective:     /69 (BP Location: Right arm, Patient Position: Lying)   Pulse 69   Temp 36.2 °C (97.2 °F) (Temporal)   Resp 18   Ht 1.549 m (5' 1\")   Wt 60.3 kg (133 lb)   SpO2 98%   BMI 25.13 kg/m²     Physical Exam  Constitutional:       General: She is not in acute distress.     Appearance: Normal appearance. She is not ill-appearing.   HENT:      Nose: No congestion or rhinorrhea.   Eyes:      Pupils: Pupils are equal, round, and reactive to light.   Cardiovascular:      Rate and Rhythm: Normal rate.      Heart sounds: No murmur heard.  Pulmonary:      Effort: No respiratory distress.      Breath sounds: No wheezing.      Comments: Light bilateral crackles posteriorly  Abdominal:      General: There is no distension.      Palpations: Abdomen is soft.      Tenderness: There is no abdominal tenderness. There is no guarding.   Musculoskeletal:         General: Tenderness present.      Right lower leg: No edema.      Left lower leg: No edema.      Comments: Chest tender to palpation.    Skin:     General: Skin is warm and dry.   Neurological:      Mental Status: She is alert and oriented to person, place, and time.       Lab Work:     Lab Results   Component Value Date    WBC 7.2 11/16/2024    HGB 13.6 11/16/2024    HCT 39.3 11/16/2024     11/16/2024     11/16/2024     Lab Results   Component Value Date    GLUCOSE 109 (H) 11/16/2024    CALCIUM 10.0 11/16/2024     11/16/2024    K 3.6 11/16/2024    CO2 31 11/16/2024    CL 97 (L) 11/16/2024    BUN 25 (H) 11/16/2024    CREATININE 0.95 11/16/2024     TSH   Date Value Ref Range Status   11/10/2022 2.50 0.44 - 3.98 mIU/L Final     Comment:      TSH testing is performed using different testing    methodology at [facility] than at other    [facility]. Direct result comparisons should "    only be made within the same method.     04/20/2021 2.51 0.44 - 3.98 mIU/L Final     Comment:      TSH testing is performed using different testing    methodology at [facility] than at other    system hospitals. Direct result comparisons should    only be made within the same method.     11/10/2020 3.21 0.44 - 3.98 mIU/L Final     Comment:      TSH testing is performed using different testing    methodology at [facility] than at other    [facility] hospitals. Direct result comparisons should    only be made within the same method.       Cultures:   No results found for the last 90 days.    Images:     No orders to display      Medications:     Scheduled Meds:  Continuous Meds:    PRN Meds:       Assessment & Plan:     Radhika Villagomez is a 74 y.o. female with a PMH of asthma/COPD, HTN, Depression, Anxiety, HLD, HFpEF w/ EF of 60%, pulmonary sarcoidosis, basal cell skin cancer, IBS, and complete heart block s/p pacemaker who was admitted to [facility] for pneumonia.    ACUTE MEDICAL ISSUES:  #Chest pain 2/2 Community Acquired Pneumonia and pleurisy  - CT PE: Consolidative opacity in the right middle lobe, mild groundglass nodular opacities in the right upper lobe and lower lobe. Mild opacity in the left upper lobe. 5 mm ground glass opacity in the left lower lobe.   - Trop: 19 -> 43 -> 48 -> 30  - EKG: No ST elevations  - s/p ceftriaxone and azithromycin in ED  - WBC 7.2 on admission  - CURB-65 score: 2  PLAN:  - Augmentin for 5 days  - Azithromycin for 3 days   - Zofran PRN  - Ibuprofen PRN  - BC x 2 obtained at [place] [facility]  - Patient follows with Dr. Hernandez for cardiology outpatient who was consulted. Per discussion with him, states that her chest pain is likely non-cardiac in nature and more likely from pneumonia/pleurisy. Will also stop spironolactone as it may have contributed to previous ER presentation on 11/04 with breast pain  - Tele  - Will hold off on repeat echo as patient had echo in  6/2024 showing EF of 60%, mitral/tricuspid regurgitation.      CHRONIC MEDICAL ISSUES:  #Depression/anxiety - continue home Prozac  #HLD - continue home Crestor   #HTN - continue home Carvedilol  #HFpEF - Continue home Bumex, Holding spirolactone     #Asthma/COPD- Continue home albuterol inhalers PRN q6h for worsening SOB and wheezing    Fluid: Replete PRN  Electrolytes: Replete PRN  Nutrition: regular diet  GI ppx: None  DVT/PE ppx: Lovenox  Abx: Azithro and Augmentin  IV Lines: PIV  O2: RA    Dispo: Admitted for chest pain in the setting of community acquired pneumonia from home. Estimated length of stay <48hrs.    Ld Montoya, PGY1

## 2024-11-17 NOTE — PROGRESS NOTES
Emergency Medicine Transition of Care Note.    I received Radhika Villagomez in signout from Dr. Wagner.  Please see the previous ED provider note for all HPI, PE and MDM up to the time of signout at 0700. This is in addition to the primary record.    In brief Radhika Villagomez is an 74 y.o. female presenting for chest pain  Chief Complaint   Patient presents with    Chest Pain     Pt in with left sided chest pain radiating to left arm and collarbone, pt reports pain is sharp and started about 30 min prior to arrival, pt report some SOB as well. Denies N/V      At the time of signout we were awaiting: transfer to [facility]    ED Course as of 11/17/24 0824   Sun Nov 17, 2024   0824 EKG obtained at 811, interpreted by myself.  Normal sinus rhythm with a ventricular rate of 75, right axis deviation, otherwise normal intervals with no acute ischemic changes [VT]      ED Course User Index  [VT] Nenita ANGUIANO MD         Diagnoses as of 11/17/24 0824   Precordial pain   Troponin level elevated   Pneumonia of both lungs due to infectious organism, unspecified part of lung       Medical Decision Making  Patient was signed out to me pending transfer to [facility] for elevated troponin in the setting of chest pain.  While in the ED awaiting transfer, patient again started complaining of chest pain.  Nitropaste is applied at this time.  In reviewing patient's workup, she was found to be somewhat hypoxic and hypotensive throughout the night.  CT imaging also showed some bilateral opacities/consolidations.  Given this, patient will be started on IV antibiotics for pneumonia.    Repeat troponin is improved at 30.    Patient is transferred to [facility] for further management.      Final diagnoses:   [R07.2] Precordial pain   [R79.89] Troponin level elevated       Procedure  Procedures    Nenita Ayala MD

## 2024-11-17 NOTE — ED TRIAGE NOTES
Pt in with left sided chest pain radiating to left arm and collarbone, pt reports pain is sharp and started about 30 min prior to arrival, pt report some SOB as well. Denies N/V

## 2024-11-17 NOTE — ED PROVIDER NOTES
HPI   Chief Complaint   Patient presents with    Chest Pain     Pt in with left sided chest pain radiating to left arm and collarbone, pt reports pain is sharp and started about 30 min prior to arrival, pt report some SOB as well. Denies N/V        Patient has been experiencing some left breast pain that prompted a visit to ultrasound as well as mammogram.  These failed to show any masses in the breast.  She then saw Dr. Chavarria and he examined her and found no abnormalities.  He recommended follow-up in 6 to 8 weeks if necessary.  Tonight the pain started again but was more intense, radiating into the left arm, shoulder, and neck.  She says she felt a bit short of breath but not nauseated.  She says she did get a bit sweaty.            Patient History   Past Medical History:   Diagnosis Date    Allergic contact dermatitis due to plants, except food 06/20/2014    Asthma 05/01/2023    Atrial enlargement, bilateral 08/15/2023    Basal cell carcinoma of skin of left upper limb, including shoulder 08/14/2023    Benign essential hypertension 05/01/2023    Bradycardia 09/07/2024    Breast pain, left 05/01/2023    Candidiasis of skin and nail 04/07/2014    Candidal intertrigo    Chronic obstructive pulmonary disease, unspecified COPD type (Multi) 10/12/2023    Community acquired pneumonia 09/07/2024    Depression with anxiety     Dermatitis, eczematoid 05/01/2023    Diastolic dysfunction 05/01/2023    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    Hx of atrial septal defect repair 08/15/2023    Insomnia 10/27/2023    Moderate pulmonary valve insufficiency 08/15/2023    Nonrheumatic mitral valve regurgitation 08/15/2023    Mod  to  severe         Nonrheumatic tricuspid valve regurgitation 08/15/2023    Other seborrheic keratosis 08/14/2023    Pacemaker 05/01/2023    Panic disorder (episodic paroxysmal anxiety) 09/06/2018    Panic disorder without agoraphobia    Personal history of colonic polyps 10/07/2016    History of colon  polyps    Pulmonary HTN (Multi) 08/15/2023    mild      Sarcoidosis 05/01/2023    Unspecified injury of lower back, subsequent encounter 08/20/2016    Injury of back, subsequent encounter    Unspecified open wound of unspecified toe(s) with damage to nail, initial encounter 06/04/2014    Traumatic avulsion of nail plate of toe     Past Surgical History:   Procedure Laterality Date    CARDIAC PACEMAKER PLACEMENT  08/13/2013    Pacemaker Placement    CT ANGIO NECK  11/10/2020    CT NECK ANGIO W AND WO IV CONTRAST 11/10/2020 [facility]    CT HEAD ANGIO W AND WO IV CONTRAST  11/10/2020    CT HEAD ANGIO W AND WO IV CONTRAST 11/10/2020 [facility]    OTHER SURGICAL HISTORY  08/13/2013    Mitral Valve Repair    OTHER SURGICAL HISTORY  08/13/2013    Biopsy Lung Percutaneous    OTHER SURGICAL HISTORY  03/02/2021    Pacemaker insertion    TOTAL ABDOMINAL HYSTERECTOMY W/ BILATERAL SALPINGOOPHORECTOMY  08/13/2013    Total Abdominal Hysterectomy With Removal Of Both Ovaries     Family History   Problem Relation Name Age of Onset    Sarcoidosis Mother      Heart attack Father      No Known Problems Sister      No Known Problems Brother      No Known Problems Brother       Social History     Tobacco Use    Smoking status: Never    Smokeless tobacco: Never   Vaping Use    Vaping status: Never Used   Substance Use Topics    Alcohol use: Not Currently    Drug use: Never       Physical Exam   ED Triage Vitals [11/16/24 2007]   Temperature Heart Rate Respirations BP   36.3 °C (97.4 °F) 77 18 (!) 181/93      Pulse Ox Temp Source Heart Rate Source Patient Position   94 % Temporal Monitor Lying      BP Location FiO2 (%)     Left arm --       Physical Exam  Vitals and nursing note reviewed.   HENT:      Head: Normocephalic and atraumatic.      Right Ear: Tympanic membrane and ear canal normal.      Left Ear: Tympanic membrane and ear canal normal.      Nose: Nose normal.      Mouth/Throat:      Mouth: Mucous membranes  are moist.   Cardiovascular:      Rate and Rhythm: Normal rate.   Pulmonary:      Effort: Pulmonary effort is normal.      Breath sounds: Normal breath sounds.   Abdominal:      General: Abdomen is flat.      Palpations: Abdomen is soft.   Musculoskeletal:         General: Normal range of motion.      Cervical back: Normal range of motion.   Skin:     General: Skin is warm and dry.   Neurological:      General: No focal deficit present.      Mental Status: She is alert.           ED Course & MDM   ED Course as of 11/17/24 1922   Sun Nov 17, 2024   0824 EKG obtained at 811, interpreted by myself.  Normal sinus rhythm with a ventricular rate of 75, right axis deviation, otherwise normal intervals with no acute ischemic changes [VT]      ED Course User Index  [VT] Nenita ANGUIANO MD         Diagnoses as of 11/17/24 1922   Precordial pain   Troponin level elevated                 No data recorded     Phillip Coma Scale Score: 15 (11/17/24 0813 : Adri Giron, SHIVA)                           Medical Decision Making  EKG interpreted by me: No obvious ST elevation or depression.  Rhythm is supraventricular.  Second EKG interpreted by me: Sinus rhythm with intraventricular conduction delay.  Intervals are normal.    Patient's chest pain was relieved after 3 nitroglycerin and 4 mg of morphine.  She had no more discomfort.  Her troponins leveled out at 48.  We were able to get the patient admitted at [facility], where there is a Cath Lab in case she needed an urgent catheterization, but for now, she is stable and having no discomfort.        Procedure  Procedures     Zion Falk MD  11/16/24 2059       Zion Falk MD  11/16/24 2145       Zion Falk MD  11/17/24 1922

## 2024-11-18 ENCOUNTER — APPOINTMENT (OUTPATIENT)
Dept: RADIOLOGY | Facility: HOSPITAL | Age: 74
End: 2024-11-18
Payer: MEDICARE

## 2024-11-18 ENCOUNTER — APPOINTMENT (OUTPATIENT)
Dept: CARDIOLOGY | Facility: HOSPITAL | Age: 74
End: 2024-11-18
Payer: MEDICARE

## 2024-11-18 ENCOUNTER — APPOINTMENT (OUTPATIENT)
Dept: PRIMARY CARE | Facility: CLINIC | Age: 74
End: 2024-11-18
Payer: MEDICARE

## 2024-11-18 VITALS
SYSTOLIC BLOOD PRESSURE: 120 MMHG | DIASTOLIC BLOOD PRESSURE: 80 MMHG | BODY MASS INDEX: 25.11 KG/M2 | HEIGHT: 61 IN | TEMPERATURE: 97.5 F | HEART RATE: 73 BPM | RESPIRATION RATE: 18 BRPM | OXYGEN SATURATION: 91 % | WEIGHT: 133 LBS

## 2024-11-18 LAB
ALBUMIN SERPL BCP-MCNC: 3.8 G/DL (ref 3.4–5)
ANION GAP SERPL CALC-SCNC: 12 MMOL/L (ref 10–20)
ATRIAL RATE: 82 BPM
BUN SERPL-MCNC: 25 MG/DL (ref 6–23)
CALCIUM SERPL-MCNC: 8.6 MG/DL (ref 8.6–10.3)
CHLORIDE SERPL-SCNC: 103 MMOL/L (ref 98–107)
CO2 SERPL-SCNC: 25 MMOL/L (ref 21–32)
CREAT SERPL-MCNC: 0.88 MG/DL (ref 0.5–1.05)
EGFRCR SERPLBLD CKD-EPI 2021: 70 ML/MIN/1.73M*2
ERYTHROCYTE [DISTWIDTH] IN BLOOD BY AUTOMATED COUNT: 12.1 % (ref 11.5–14.5)
GLUCOSE SERPL-MCNC: 87 MG/DL (ref 74–99)
HCT VFR BLD AUTO: 34.4 % (ref 36–46)
HGB BLD-MCNC: 11.5 G/DL (ref 12–16)
MAGNESIUM SERPL-MCNC: 1.93 MG/DL (ref 1.6–2.4)
MCH RBC QN AUTO: 33.5 PG (ref 26–34)
MCHC RBC AUTO-ENTMCNC: 33.4 G/DL (ref 32–36)
MCV RBC AUTO: 100 FL (ref 80–100)
NRBC BLD-RTO: 0 /100 WBCS (ref 0–0)
PHOSPHATE SERPL-MCNC: 3 MG/DL (ref 2.5–4.9)
PLATELET # BLD AUTO: 174 X10*3/UL (ref 150–450)
POTASSIUM SERPL-SCNC: 4.3 MMOL/L (ref 3.5–5.3)
Q ONSET: 205 MS
QRS COUNT: 12 BEATS
QRS DURATION: 116 MS
QT INTERVAL: 478 MS
QTC CALCULATION(BAZETT): 523 MS
QTC FREDERICIA: 508 MS
R AXIS: 120 DEGREES
RBC # BLD AUTO: 3.43 X10*6/UL (ref 4–5.2)
SODIUM SERPL-SCNC: 136 MMOL/L (ref 136–145)
T AXIS: 51 DEGREES
T OFFSET: 444 MS
VENTRICULAR RATE: 72 BPM
WBC # BLD AUTO: 6.7 X10*3/UL (ref 4.4–11.3)

## 2024-11-18 PROCEDURE — 36415 COLL VENOUS BLD VENIPUNCTURE: CPT

## 2024-11-18 PROCEDURE — 2500000001 HC RX 250 WO HCPCS SELF ADMINISTERED DRUGS (ALT 637 FOR MEDICARE OP)

## 2024-11-18 PROCEDURE — 99239 HOSP IP/OBS DSCHRG MGMT >30: CPT | Performed by: INTERNAL MEDICINE

## 2024-11-18 PROCEDURE — 80069 RENAL FUNCTION PANEL: CPT

## 2024-11-18 PROCEDURE — 85027 COMPLETE CBC AUTOMATED: CPT

## 2024-11-18 PROCEDURE — G0378 HOSPITAL OBSERVATION PER HR: HCPCS

## 2024-11-18 PROCEDURE — 83735 ASSAY OF MAGNESIUM: CPT

## 2024-11-18 RX ORDER — AMOXICILLIN AND CLAVULANATE POTASSIUM 875; 125 MG/1; MG/1
1 TABLET, FILM COATED ORAL EVERY 12 HOURS SCHEDULED
Qty: 8 TABLET | Refills: 0 | Status: SHIPPED | OUTPATIENT
Start: 2024-11-18 | End: 2024-11-18

## 2024-11-18 RX ORDER — AZITHROMYCIN 500 MG/1
500 TABLET, FILM COATED ORAL
Qty: 2 TABLET | Refills: 0 | Status: SHIPPED | OUTPATIENT
Start: 2024-11-18 | End: 2024-11-20

## 2024-11-18 RX ORDER — AZITHROMYCIN 500 MG/1
500 TABLET, FILM COATED ORAL
Qty: 2 TABLET | Refills: 0 | Status: SHIPPED | OUTPATIENT
Start: 2024-11-18 | End: 2024-11-18

## 2024-11-18 RX ORDER — AMOXICILLIN AND CLAVULANATE POTASSIUM 875; 125 MG/1; MG/1
1 TABLET, FILM COATED ORAL EVERY 12 HOURS SCHEDULED
Qty: 8 TABLET | Refills: 0 | Status: SHIPPED | OUTPATIENT
Start: 2024-11-18 | End: 2024-11-22

## 2024-11-18 RX ADMIN — BUMETANIDE 0.5 MG: 1 TABLET ORAL at 08:31

## 2024-11-18 RX ADMIN — AMOXICILLIN AND CLAVULANATE POTASSIUM 1 TABLET: 875; 125 TABLET, FILM COATED ORAL at 08:31

## 2024-11-18 RX ADMIN — FLUOXETINE HYDROCHLORIDE 20 MG: 20 CAPSULE ORAL at 08:31

## 2024-11-18 RX ADMIN — AZITHROMYCIN DIHYDRATE 500 MG: 500 TABLET ORAL at 08:31

## 2024-11-18 RX ADMIN — IBUPROFEN 600 MG: 600 TABLET, FILM COATED ORAL at 08:31

## 2024-11-18 ASSESSMENT — PAIN - FUNCTIONAL ASSESSMENT: PAIN_FUNCTIONAL_ASSESSMENT: 0-10

## 2024-11-18 ASSESSMENT — COGNITIVE AND FUNCTIONAL STATUS - GENERAL
MOBILITY SCORE: 24
DAILY ACTIVITIY SCORE: 24

## 2024-11-18 ASSESSMENT — ACTIVITIES OF DAILY LIVING (ADL): LACK_OF_TRANSPORTATION: NO

## 2024-11-18 ASSESSMENT — PAIN DESCRIPTION - LOCATION: LOCATION: HEAD

## 2024-11-18 ASSESSMENT — PAIN SCALES - GENERAL
PAINLEVEL_OUTOF10: 3
PAINLEVEL_OUTOF10: 0 - NO PAIN

## 2024-11-18 NOTE — PROGRESS NOTES
11/18/24 0844   Discharge Planning   Living Arrangements Spouse/significant other   Support Systems Spouse/significant other   Assistance Needed Alert and oriented x 3, Independent with ADL's, No DME, Room air at baseline, Drives, PCP Dr. Tia Crowley   Type of Residence Private residence   Number of Stairs to Enter Residence 4   Number of Stairs Within Residence 0   Do you have animals or pets at home? Yes   Type of Animals or Pets 1 dog   Who is requesting discharge planning? Provider   Home or Post Acute Services None   Expected Discharge Disposition Home   Does the patient need discharge transport arranged? No   RoundTrip coordination needed? No   Financial Resource Strain   How hard is it for you to pay for the very basics like food, housing, medical care, and heating? Not hard   Housing Stability   In the last 12 months, was there a time when you were not able to pay the mortgage or rent on time? N   In the past 12 months, how many times have you moved where you were living? 0   At any time in the past 12 months, were you homeless or living in a shelter (including now)? N   Transportation Needs   In the past 12 months, has lack of transportation kept you from medical appointments or from getting medications? no   In the past 12 months, has lack of transportation kept you from meetings, work, or from getting things needed for daily living? No   Patient Choice   Provider Choice list and CMS website (https://medicare.gov/care-compare#search) for post-acute Quality and Resource Measure Data were provided and reviewed with: Patient   Patient / Family choosing to utilize agency / facility established prior to hospitalization No

## 2024-11-18 NOTE — NURSING NOTE
Assumed care of patient. Patient is A&Ox3 and is resting in bed watching tv on room air. Call light in reach. Patient denies pain  and is paced on monitor @ 74bpm.

## 2024-11-18 NOTE — DISCHARGE SUMMARY
Discharge Diagnosis  Community acquired pneumonia  Pleuritic chest pain    Issues Requiring Follow-Up  Take antibiotic for treatment of pneumonia.  Stay hydrated, nourished and get plenty of rest to allow recovery.  Followup with PCP and cardiology as outpatient.     Discharge Meds     Medication List      START taking these medications     amoxicillin-pot clavulanate 875-125 mg tablet; Commonly known as:   Augmentin; Take 1 tablet by mouth every 12 hours for 8 doses.   azithromycin 500 mg tablet; Commonly known as: Zithromax; Take 1 tablet   (500 mg) by mouth once every 24 hours for 2 doses.     CONTINUE taking these medications     albuterol 90 mcg/actuation inhaler; Commonly known as: ProAir HFA;   Inhale 2 puffs every 6 hours if needed for wheezing or shortness of   breath.   bumetanide 1 mg tablet; Commonly known as: Bumex   carvedilol 6.25 mg tablet; Commonly known as: Coreg   FLUoxetine 20 mg tablet; Commonly known as: PROzac; Take 1 tablet (20   mg) by mouth once daily.   ibuprofen 600 mg tablet; Take 1 tablet (600 mg) by mouth every 8 hours   if needed for mild pain (1 - 3) or fever (temp greater than 38.0 C).   rosuvastatin 10 mg tablet; Commonly known as: Crestor; Take 1 tablet (10   mg) by mouth once daily.     STOP taking these medications     spironolactone 25 mg tablet; Commonly known as: Aldactone       Test Results Pending At Discharge  Pending Labs       No current pending labs.            Hospital Course  HPI:  Radhika Villagomez is a 74 y.o. female with a PMH of COPD, HTN, HFpEF, Asthma, Depression, Anxiety, HLD, sarcoidosis, IBS, and heart block s/p pacemaker who was transferred to [facility] from [place] with c/o chest pain and shortness of breath.     Patient stated that last night around 7pm she was sitting in her chair when she started to experience chest pain. She described this pain as sharp in the center of her chest that radiated to her left shoulder and down her left arm. She tried to walk  around to see if it would make it better but the pain remained the same and she decided to go to the ED. During this time she also experienced some shortness of breath, sweating, and nausea. Patient denied any recent illness, cough, sick contacts, dysuria, vomiting, diarrhea, fever, chills, and abdominal pain. Patient stated that palpating her chest reproduced a similar pain that she was feeling earlier and that nitroglycerin in ED helped relieve some pain.     This morning she still had the chest pain and nausea, but at the time of evaluation the symptoms have resolved.     ED COURSE:  Vitals:   - /69 , HR 69 , RR 18 , SpO2 98, T 36.2C  Labs:  - WBC 7.2, HGB 13.6,   - Na 136, Cl 97, K 3.6, bicarb 31, BUN 25, Cr 0.95  - Trop: 19 -> 43 -> 48 ->30  Imaging:  - CT PE; No evidence of acute pulmonary embolism. There is consolidative opacity in the right middle lobe. There are mild groundglass nodular opacities in the right upper lobe and lower lobe. There is mild opacity in the left upper lobe. 5 mm ground glass opacity in the left lower lobe. No significant pleural effusion. No pneumothorax.  Interventions:   - Nitroglycerin, Ticagrelor 180mg, Aspirin 324mg, Zofran 4mg, Ceftriaxone 2G, Azithromycin 500mg. Ketorolac 15mg.     Upon [facility] arrival, patient was started on Augmentin and azithromycin. Dr. Hernandez, her outpatient cardiologist, saw her in the morning prior to transfer, stated that her pain is likely from pneumonia and pleurisy rather than from a cardiac etiology. Thus recommended treatment with antibiotic.    Reassessed on 11/18/24 and patient reports improvement in symptoms. Telemetry reviewed and unremarkable for ischemic event. Hemodynamically stable. Labs unremarkable including the troponin which when rechecked again yesterday continued to decrease down to 16.    Patient discharged home today (11/18/24). All questions answered from her perspective. Agreeable with discharge plan.     Time spent  with discharge planning approximately 40 minutes.     Pertinent Physical Exam At Time of Discharge  Physical Exam  Constitutional: Pleasant, Awake/Alert/Oriented to person place and time. No distress  Head: Atraumatic, Normocephalic  Cardiovascular: Regular rate and rhythm, S1, S2. No extra heart sounds or murmurs  Respiratory: Clear to auscultation bilaterally. No wheezing, rales or rhonchi. Good chest wall expansion  Abdomen: Soft, Nontender. Bowel sounds appreciated  Musculoskeletal: ROM intact. Muscle strength grossly intact upper and lower extremities 5/5.   Extremities: Warm and dry. No acute rashes and lesions  Psychiatric: Appropriate mood and affect    Outpatient Follow-Up  Future Appointments   Date Time Provider Department Center   1/14/2025  1:00 PM [facility] JUAN [facility] [id number] [facility] [place]   1/14/2025  1:30 PM Josie Vega MD [id number] [facility]   4/28/2025 10:30 AM JHONATHAN Tomlin-CNP [id number] [facility]         Arthur Ga DO

## 2024-11-18 NOTE — NURSING NOTE
Discharge instructions reviewed with patient. No questions at this time. Education given for new homegoing medications with type, uses, and most common side effects. Patient verbalized understanding. Handout on PNA given. Telemetry removed and left at nurses station. IV removed. Discharged home in stable condition.

## 2024-11-19 ENCOUNTER — PATIENT OUTREACH (OUTPATIENT)
Dept: PRIMARY CARE | Facility: CLINIC | Age: 74
End: 2024-11-19
Payer: MEDICARE

## 2024-11-19 ENCOUNTER — HOSPITAL ENCOUNTER (OUTPATIENT)
Dept: CARDIOLOGY | Facility: HOSPITAL | Age: 74
Discharge: HOME | End: 2024-11-19
Payer: MEDICARE

## 2024-11-19 DIAGNOSIS — J18.9 PNEUMONIA DUE TO INFECTIOUS ORGANISM, UNSPECIFIED LATERALITY, UNSPECIFIED PART OF LUNG: ICD-10-CM

## 2024-11-19 DIAGNOSIS — Z09 HOSPITAL DISCHARGE FOLLOW-UP: ICD-10-CM

## 2024-11-19 PROCEDURE — 93005 ELECTROCARDIOGRAM TRACING: CPT

## 2024-11-19 NOTE — PROGRESS NOTES
TCM completed 11/19/24   Discharge Facility: [facility]  Discharge Diagnosis: Community acquired pneumonia, Pleuritic chest pain  Admission Date: 11/17/24  Discharge Date: 11/18/24    PCP Appointment Date: Will call back at a later date/time. Needs seen by: 12/3/24.   Specialist Appointment Date: Cardiology- 11/19/24  Hospital Encounter and Summary Linked: Yes                    --See discharge assessment below for further details--  Engagement  Call Start Time: 1442 (11/19/2024  2:48 PM)    Medications  Medications reviewed with patient/caregiver?: Yes (11/19/2024  2:48 PM)  Is the patient having any side effects they believe may be caused by any medication additions or changes?: No (11/19/2024  2:48 PM)  Does the patient have all medications ordered at discharge?: Yes (11/19/2024  2:48 PM)  Care Management Interventions: No intervention needed; Provided patient education (11/19/2024  2:48 PM)  Prescription Comments: START taking:  Amoxicillin-pot clavulanate (Augmentin)  and  Azithromycin (Zithromax).    STOP taking: Spironolactone 25 mg tablet (Aldactone). (11/19/2024  2:48 PM)  Is the patient taking all medications as directed (includes completed medication regime)?: Yes (11/19/2024  2:48 PM)  Care Management Interventions: Provided patient education (11/19/2024  2:48 PM)  Medication Comments: See medication list. Picked up from [facility] after discharge. (11/19/2024  2:48 PM)    Appointments  Does the patient have a primary care provider?: Yes (11/19/2024  2:48 PM)  Care Management Interventions: Educated patient on importance of making appointment; Advised patient to make appointment (11/19/2024  2:48 PM)  Has the patient kept scheduled appointments due by today?: Not applicable (11/19/2024  2:48 PM)    Self Management  What is the home health agency?: n/a (11/19/2024  2:48 PM)  Has home health visited the patient within 72 hours of discharge?: Not applicable (11/19/2024  2:48 PM)  What Durable Medical  "Equipment (DME) was ordered?: n/a (11/19/2024  2:48 PM)    Patient Teaching  Does the patient have access to their discharge instructions?: Yes (11/19/2024  2:48 PM)  Care Management Interventions: Reviewed instructions with patient; Educated on MyChart (11/19/2024  2:48 PM)  What is the patient's perception of their health status since discharge?: Improving (11/19/2024  2:48 PM)  Is the patient/caregiver able to teach back the hierarchy of who to call/visit for symptoms/problems? PCP, Specialist, Home Health nurse, Urgent Care, ED, 911: Yes (11/19/2024  2:48 PM)  Patient/Caregiver Education Comments: Spoke with the patient who states she is home and \" doing much better\" today. Patient denied any acute changes or concerns since leaving the hospital. (11/19/2024  2:48 PM)    Wrap Up  Wrap Up Additional Comments: TCM initial outreach post discharge completed successfully. Patient confirmed she received her discharge summary and has all medications needed in the home. Patient denied any questions/concerns regarding medication changes or her hospital stay.  Patient denied need for DME, HHA or assistance obtaining transportation.  TCM phone number was provided to the patient, with the patient encouraged to call back with any non-emergent questions or concerns. Patient verbalized her understanding and states she has none, but will call back if needed. Patient declined to schedule a PCP follow up appt during outreach today, stating she plans to call back in a few days to schedule her appt. (11/19/2024  2:48 PM)  Call End Time: 1445 (11/19/2024  2:48 PM)        "

## 2024-11-20 LAB
ATRIAL RATE: 58 BPM
ATRIAL RATE: 78 BPM
ATRIAL RATE: 89 BPM
Q ONSET: 197 MS
Q ONSET: 198 MS
Q ONSET: 206 MS
QRS COUNT: 12 BEATS
QRS COUNT: 13 BEATS
QRS COUNT: 13 BEATS
QRS DURATION: 122 MS
QRS DURATION: 138 MS
QRS DURATION: 138 MS
QT INTERVAL: 446 MS
QT INTERVAL: 452 MS
QT INTERVAL: 454 MS
QTC CALCULATION(BAZETT): 495 MS
QTC CALCULATION(BAZETT): 504 MS
QTC CALCULATION(BAZETT): 513 MS
QTC FREDERICIA: 478 MS
QTC FREDERICIA: 486 MS
QTC FREDERICIA: 493 MS
R AXIS: 108 DEGREES
R AXIS: 111 DEGREES
R AXIS: 122 DEGREES
T AXIS: -1 DEGREES
T AXIS: -28 DEGREES
T AXIS: 24 DEGREES
T OFFSET: 421 MS
T OFFSET: 424 MS
T OFFSET: 432 MS
VENTRICULAR RATE: 74 BPM
VENTRICULAR RATE: 75 BPM
VENTRICULAR RATE: 77 BPM

## 2024-11-21 LAB
BACTERIA BLD CULT: NORMAL
BACTERIA BLD CULT: NORMAL

## 2024-11-26 DIAGNOSIS — J45.909 ASTHMA, UNSPECIFIED ASTHMA SEVERITY, UNSPECIFIED WHETHER COMPLICATED, UNSPECIFIED WHETHER PERSISTENT (HHS-HCC): ICD-10-CM

## 2024-11-26 DIAGNOSIS — J44.9 CHRONIC OBSTRUCTIVE PULMONARY DISEASE, UNSPECIFIED COPD TYPE (MULTI): Primary | ICD-10-CM

## 2024-11-26 NOTE — PROGRESS NOTES
I reviewed the progress note and agree with the resident’s findings and plans as written. Case discussed with resident.    Elsie Liao, PharmD  Clinical Pharmacist - Primary Care  [phone]

## 2024-12-10 ENCOUNTER — PATIENT OUTREACH (OUTPATIENT)
Dept: PRIMARY CARE | Facility: CLINIC | Age: 74
End: 2024-12-10
Payer: MEDICARE

## 2024-12-10 DIAGNOSIS — Z09 HOSPITAL DISCHARGE FOLLOW-UP: ICD-10-CM

## 2024-12-10 NOTE — PROGRESS NOTES
30 Minute Treatment: Expressive Language Delay    Teodoro attended  today to address the above. He was accompanied by his mom. They showed quite late for session (approx 12 minutes) after showing too late and having to miss session completely just last week. It was expressed to mom the importance of planning to arrive 10-15 min before appt in case of traffic etc. Mom reported some confusion about policy.    Teodoro's performance was as follows:      Long-term goals:  Teodoro will exhibit:  1. Age appropriate expressive language skills.     Short-term objectives:  Teodoro will:    1. Imitate single words in play X 10/session with min-mod multimodal cues across 3 consecutive sessions. X 10; Many repetitions of same targets in play task. Imitating labeling of colors and playdough actions. Improvement with reps today, great progress (progress made).  2. Spontaneously produce 1-2 word functional utterances in play X 10/session across 3 consecutives sessions. Some spontaneous productions but very focused on playdough, required stronger cues (progress made).  3. Produce set of isolated speech sounds /p,b,f,v,t,d,s,z,h,m,n,k,g/ provided visual cards, models, and hand cues with every session for improved speech sound production, learning hand cues, and discrimination. Teodoro briefly cycled through speech sounds/willing to imitate. Unable to produce /f,v,m,z/ today. Improved production of /m/ and /z/-closer approximations, continues to refine and kian in on closer approximation (consistent with previous).  4. Imitate set of 11 CVCV words provided min-mod multimodal cues and hand cues for speech sounds with 70% acc across 3 consecutive sessions. Continue to work on each syllable in isolation for the majority of words but was able to produce 2 words correctly including vowel sound at the word level; boom etc (progress made).  4. Parent education for carryover of skills. Mom engaged in session (progress  Unable to reach patient for call back 14 days post discharge from the hospital. If no voicemail available call attempts x 2 were made to contact the patient to assist with any questions or concerns patient may have. Patient did not follow up with their PCP post discharge. Appointment was canceled and never rescheduled.  TCM program closed per policy.      maintained).    Plan/Recommendations:   1. Initiate speech therapy twice per week, 30 minute individual sessions, with a home program to address long-term and short-term goals described below.   2. Continue peer stimulation via family/friends.  3. Continued home stimulation with parent education.   4. Continued follow-up with referring physician and/or PCP as needed for medical care/management.  5. Contact the provider at [phone] with any further questions or concerns.    Discussed evaluation results with Teodoro's mother, who verbalized agreement with treatment plan.

## 2025-01-13 DIAGNOSIS — E78.5 HYPERLIPIDEMIA, UNSPECIFIED HYPERLIPIDEMIA TYPE: ICD-10-CM

## 2025-01-13 RX ORDER — ROSUVASTATIN CALCIUM 10 MG/1
10 TABLET, COATED ORAL DAILY
Qty: 90 TABLET | Refills: 2 | Status: SHIPPED | OUTPATIENT
Start: 2025-01-13

## 2025-01-13 NOTE — TELEPHONE ENCOUNTER
Last seen: 10/29/2024   Last Filled by Dr. Payne on 01/29/2024 #90 Refills:3     Last Lipid: 10/29/2024

## 2025-01-14 ENCOUNTER — OFFICE VISIT (OUTPATIENT)
Dept: CARDIOLOGY | Facility: CLINIC | Age: 75
End: 2025-01-14
Payer: MEDICARE

## 2025-01-14 ENCOUNTER — HOSPITAL ENCOUNTER (OUTPATIENT)
Dept: CARDIOLOGY | Facility: CLINIC | Age: 75
Discharge: HOME | End: 2025-01-14
Payer: MEDICARE

## 2025-01-14 VITALS
OXYGEN SATURATION: 95 % | BODY MASS INDEX: 26.15 KG/M2 | WEIGHT: 138.5 LBS | SYSTOLIC BLOOD PRESSURE: 115 MMHG | HEIGHT: 61 IN | HEART RATE: 74 BPM | DIASTOLIC BLOOD PRESSURE: 73 MMHG

## 2025-01-14 DIAGNOSIS — I44.2 ATRIOVENTRICULAR BLOCK, COMPLETE (MULTI): ICD-10-CM

## 2025-01-14 DIAGNOSIS — Z95.0 PACEMAKER: ICD-10-CM

## 2025-01-14 DIAGNOSIS — I49.5 SICK SINUS SYNDROME DUE TO SA NODE DYSFUNCTION (MULTI): ICD-10-CM

## 2025-01-14 PROCEDURE — 3074F SYST BP LT 130 MM HG: CPT | Performed by: INTERNAL MEDICINE

## 2025-01-14 PROCEDURE — 99214 OFFICE O/P EST MOD 30 MIN: CPT | Performed by: INTERNAL MEDICINE

## 2025-01-14 PROCEDURE — 3078F DIAST BP <80 MM HG: CPT | Performed by: INTERNAL MEDICINE

## 2025-01-14 PROCEDURE — 1159F MED LIST DOCD IN RCRD: CPT | Performed by: INTERNAL MEDICINE

## 2025-01-14 PROCEDURE — 93280 PM DEVICE PROGR EVAL DUAL: CPT

## 2025-01-14 PROCEDURE — 1126F AMNT PAIN NOTED NONE PRSNT: CPT | Performed by: INTERNAL MEDICINE

## 2025-01-14 PROCEDURE — 3008F BODY MASS INDEX DOCD: CPT | Performed by: INTERNAL MEDICINE

## 2025-01-14 RX ORDER — AMOXICILLIN 500 MG/1
2000 CAPSULE ORAL
COMMUNITY

## 2025-01-14 RX ORDER — SPIRONOLACTONE 25 MG/1
1 TABLET ORAL
COMMUNITY
Start: 2025-01-08

## 2025-01-14 ASSESSMENT — PATIENT HEALTH QUESTIONNAIRE - PHQ9
2. FEELING DOWN, DEPRESSED OR HOPELESS: NOT AT ALL
1. LITTLE INTEREST OR PLEASURE IN DOING THINGS: NOT AT ALL
SUM OF ALL RESPONSES TO PHQ9 QUESTIONS 1 AND 2: 0

## 2025-01-14 ASSESSMENT — PAIN SCALES - GENERAL: PAINLEVEL_OUTOF10: 0-NO PAIN

## 2025-01-14 NOTE — PATIENT INSTRUCTIONS
It was nice to see you today!  You were seen for you follow up of your pacemaker (s).  The ventricular pacemaker - Micra AV has avout 6-7 years.  We can check this every 6 months.  The atrial pacemaker ( Medtronic Adapta)  in the left upper chest) should be checked every 3 months as the battery is approaching elective replacement.      We anticipate replacing the upper pacemaker when it reaches elective replacement.  Please call with any questions.   Schedule 1 year follow up with Dr Vega and device clinic.

## 2025-01-14 NOTE — PROGRESS NOTES
Returns for follow up visit for    Chief Complaint   Patient presents with    Complete heart block     Patient presents to clinic voicing no complaints. She denies palpitations, lightheadedness, syncope, dyspnea, orthopnea and chest pain/discomfort. DAWIT Bradshaw RN           History Of Present Illness:    Radhika Villagomez is a 74 y.o. year old female patient   Original pacemaker [employer] Adapta DR place   1.          SSS  Followed by  device clinic at   Complete heart block  original pacemaker 1999  Medtronic Pacemaker in situ since 1999, , Generator change in 2009 with RV lead fracture so  extraction and replacement  MICRA placed 11/20/20 d/t lead fx 11/2020  The RA lead remains active as she intermittently paces in the RA.  The Micra AV is tracking the atrial activity and pacing 100% in the V.  2.          Hypertension, optimal  3.          Sarcoidosis  4.          ASD repair  5.          Chronic diastolic heart failure, compensated  NYHA Class=II  6.          Hyperlipidemia  LDL=62(11/10/23)  7.          KATHY(6/15/21) with normal LV function, small PFO with left=>right shunt  8.          Valvular Heart Disease, asymptomatic  Echo, 6/6/23: moderate-severe MR  KATHY revealed moderate MR, 6/21  9.          Shortness of breath has improved in the last few months, but continues, stable        Past Medical History:  Past Medical History:   Diagnosis Date    Allergic contact dermatitis due to plants, except food 06/20/2014    Asthma 05/01/2023    Atrial enlargement, bilateral 08/15/2023    Basal cell carcinoma of skin of left upper limb, including shoulder 08/14/2023    Benign essential hypertension 05/01/2023    Bradycardia 09/07/2024    Breast pain, left 05/01/2023    Candidiasis of skin and nail 04/07/2014    Candidal intertrigo    Chronic obstructive pulmonary disease, unspecified COPD type (Multi) 10/12/2023    Community acquired pneumonia 09/07/2024    Depression with anxiety     Dermatitis, eczematoid 05/01/2023     Diastolic dysfunction 05/01/2023    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    Hx of atrial septal defect repair 08/15/2023    Insomnia 10/27/2023    Moderate pulmonary valve insufficiency 08/15/2023    Nonrheumatic mitral valve regurgitation 08/15/2023    Mod  to  severe         Nonrheumatic tricuspid valve regurgitation 08/15/2023    Other seborrheic keratosis 08/14/2023    Pacemaker 05/01/2023    Panic disorder (episodic paroxysmal anxiety) 09/06/2018    Panic disorder without agoraphobia    Personal history of colonic polyps 10/07/2016    History of colon polyps    Pulmonary HTN (Multi) 08/15/2023    mild      Sarcoidosis 05/01/2023    Unspecified injury of lower back, subsequent encounter 08/20/2016    Injury of back, subsequent encounter    Unspecified open wound of unspecified toe(s) with damage to nail, initial encounter 06/04/2014    Traumatic avulsion of nail plate of toe   .     Past Surgical History:  Past Surgical History:   Procedure Laterality Date    CARDIAC PACEMAKER PLACEMENT  08/13/2013    Pacemaker Placement    CT ANGIO NECK  11/10/2020    CT NECK ANGIO W AND WO IV CONTRAST 11/10/2020 [facility]    CT HEAD ANGIO W AND WO IV CONTRAST  11/10/2020    CT HEAD ANGIO W AND WO IV CONTRAST 11/10/2020 [facility]    OTHER SURGICAL HISTORY  08/13/2013    Mitral Valve Repair    OTHER SURGICAL HISTORY  08/13/2013    Biopsy Lung Percutaneous    OTHER SURGICAL HISTORY  03/02/2021    Pacemaker insertion    TOTAL ABDOMINAL HYSTERECTOMY W/ BILATERAL SALPINGOOPHORECTOMY  08/13/2013    Total Abdominal Hysterectomy With Removal Of Both Ovaries        Social History:  Caffeine:  2 cups day  Cigarettes: none  ETOH: none  Drugs: none  [facility] 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  Occ: retired  Marital status:  M lives with     Family History:  Family History   Problem Relation Name Age of Onset    Sarcoidosis Mother      Heart attack Father      No Known  "Problems Sister      No Known Problems Brother      No Known Problems Brother           Allergies:  Allergies   Allergen Reactions    Sulfa (Sulfonamide Antibiotics) Hives        Outpatient Medications:  Current Outpatient Medications   Medication Instructions    albuterol (ProAir HFA) 90 mcg/actuation inhaler 2 puffs, inhalation, Every 6 hours PRN    bumetanide (BUMEX) 1 mg, Daily    carvedilol (COREG) 6.25 mg, Nightly    FLUoxetine (PROZAC) 20 mg, oral, Daily    ibuprofen 600 mg, oral, Every 8 hours PRN    rosuvastatin (CRESTOR) 10 mg, oral, Daily    spironolactone (Aldactone) 25 mg tablet 1 tablet, Daily (0630)         Last Recorded Vitals:      11/17/2024     8:30 AM 11/17/2024    11:21 AM 11/17/2024     5:01 PM 11/17/2024     8:49 PM 11/17/2024    10:12 PM 11/18/2024     6:06 AM 1/14/2025     1:35 PM   Vitals   Systolic 127 119 110 112  120 115   Diastolic 67 69 70 71  80 73   BP Location  Right arm Right arm    Left arm   Heart Rate 75 69 74 73 71 73 74   Temp  36.2 °C (97.2 °F) 36.1 °C (97 °F) 36.3 °C (97.3 °F)  36.4 °C (97.5 °F)    Resp 19 18 17 18 18 18    Height  1.549 m (5' 1\")     1.549 m (5' 1\")   Weight (lb)  133     138.5   BMI  25.13 kg/m2     26.17 kg/m2   BSA (m2)  1.61 m2     1.64 m2   Visit Report       Report        Physical Exam:  Physical Exam  Constitutional:       Appearance: Normal appearance.   HENT:      Head: Normocephalic.      Nose: Nose normal.   Neck:      Vascular: No carotid bruit.   Cardiovascular:      Rate and Rhythm: Normal rate and regular rhythm.      Heart sounds: Normal heart sounds. No murmur heard.  Pulmonary:      Breath sounds: Normal breath sounds. No wheezing or rales.   Chest:          Comments: Pacemaker pocket well healed  Musculoskeletal:         General: Normal range of motion.      Right lower leg: No edema.      Left lower leg: No edema.   Skin:     General: Skin is warm and dry.   Neurological:      General: No focal deficit present.      Mental Status: She is " alert and oriented to person, place, and time.   Psychiatric:         Mood and Affect: Mood normal.         Behavior: Behavior normal.          Last Cardiology Tests:  ECG:  Encounter Date: 11/17/24   Electrocardiogram, 12-lead PRN ACS symptoms   Result Value    Ventricular Rate 72    Atrial Rate 82    QRS Duration 116    QT Interval 478    QTC Calculation(Bazett) 523    R Axis 120    T Axis 51    QRS Count 12    Q Onset 205    T Offset 444    QTC Fredericia 508    Narrative    Accelerated Junctional rhythm  Septal infarct (cited on or before 16-NOV-2024)  Lateral infarct (cited on or before 17-NOV-2024)  Prolonged QT  Abnormal ECG  When compared with ECG of 17-NOV-2024 08:11, (unconfirmed)  Previous ECG has undetermined rhythm, needs review  Questionable change in initial forces of Anterior leads        Echo:  No echocardiogram results found for the past 12 months           Lab review: I have Chemistry CMP:   Lab Results   Component Value Date    ALBUMIN 3.8 11/18/2024    CALCIUM 8.6 11/18/2024    CO2 25 11/18/2024    CREATININE 0.88 11/18/2024    GLUCOSE 87 11/18/2024    BILITOT 0.7 11/16/2024    PROT 7.8 11/16/2024    ALT 17 11/16/2024    AST 23 11/16/2024    ALKPHOS 53 11/16/2024   , Chemistry BMP   Lab Results   Component Value Date    GLUCOSE 87 11/18/2024    CALCIUM 8.6 11/18/2024    CO2 25 11/18/2024    CREATININE 0.88 11/18/2024   , and CBC:  Lab Results   Component Value Date    WBC 6.7 11/18/2024    RBC 3.43 (L) 11/18/2024    HGB 11.5 (L) 11/18/2024    HCT 34.4 (L) 11/18/2024     11/18/2024    MCH 33.5 11/18/2024    MCHC 33.4 11/18/2024    RDW 12.1 11/18/2024    NRBC 0.0 11/18/2024       Assessment/Plan   Problem List Items Addressed This Visit    None  Visit Diagnoses         Codes    Atrioventricular block, complete (Multi)     I44.2    Relevant Orders    ECG 12 lead (Clinic Performed)        AV block  - currently using an atrial lead from her pacemaker and Micra AV -       Josie Vega MD

## 2025-01-31 LAB
ATRIAL RATE: 66 BPM
Q ONSET: 209 MS
QRS COUNT: 14 BEATS
QRS DURATION: 128 MS
QT INTERVAL: 416 MS
QTC CALCULATION(BAZETT): 488 MS
QTC FREDERICIA: 463 MS
R AXIS: 114 DEGREES
T AXIS: -29 DEGREES
T OFFSET: 417 MS
VENTRICULAR RATE: 83 BPM

## 2025-04-09 LAB
ATRIAL RATE: 82 BPM
Q ONSET: 205 MS
QRS COUNT: 12 BEATS
QRS DURATION: 116 MS
QT INTERVAL: 478 MS
QTC CALCULATION(BAZETT): 523 MS
QTC FREDERICIA: 508 MS
R AXIS: 120 DEGREES
T AXIS: 51 DEGREES
T OFFSET: 444 MS
VENTRICULAR RATE: 72 BPM

## 2025-04-28 ENCOUNTER — APPOINTMENT (OUTPATIENT)
Dept: DERMATOLOGY | Facility: CLINIC | Age: 75
End: 2025-04-28
Payer: MEDICARE

## 2025-04-28 DIAGNOSIS — L90.5 SCAR CONDITIONS AND FIBROSIS OF SKIN: ICD-10-CM

## 2025-04-28 DIAGNOSIS — L30.9 DERMATITIS: ICD-10-CM

## 2025-04-28 DIAGNOSIS — D22.9 BENIGN NEVUS: ICD-10-CM

## 2025-04-28 DIAGNOSIS — L57.9 SKIN CHANGES DUE TO CHRONIC EXPOSURE TO NONIONIZING RADIATION: ICD-10-CM

## 2025-04-28 DIAGNOSIS — D18.01 ANGIOMA OF SKIN: ICD-10-CM

## 2025-04-28 DIAGNOSIS — Z85.828 HISTORY OF NONMELANOMA SKIN CANCER: ICD-10-CM

## 2025-04-28 DIAGNOSIS — L81.4 LENTIGO: ICD-10-CM

## 2025-04-28 DIAGNOSIS — L82.1 SEBORRHEIC KERATOSIS: ICD-10-CM

## 2025-04-28 DIAGNOSIS — L57.0 ACTINIC KERATOSIS: Primary | ICD-10-CM

## 2025-04-28 PROCEDURE — 1036F TOBACCO NON-USER: CPT | Performed by: NURSE PRACTITIONER

## 2025-04-28 PROCEDURE — 17000 DESTRUCT PREMALG LESION: CPT | Performed by: NURSE PRACTITIONER

## 2025-04-28 PROCEDURE — 99213 OFFICE O/P EST LOW 20 MIN: CPT | Performed by: NURSE PRACTITIONER

## 2025-04-28 PROCEDURE — 17003 DESTRUCT PREMALG LES 2-14: CPT | Performed by: NURSE PRACTITIONER

## 2025-04-28 PROCEDURE — 1159F MED LIST DOCD IN RCRD: CPT | Performed by: NURSE PRACTITIONER

## 2025-04-28 NOTE — PATIENT INSTRUCTIONS

## 2025-04-28 NOTE — Clinical Note
Reports using TAC BID PRN, not using more than 14 days a month. Continue with TAC BID PRN. No RF needed.     The following information regarding the use of topical steroids was provided: Local skin thinning,striae, and telangiectasia can occur with chronic application of this medication.  Long term use oftopical steroids should be avoided

## 2025-04-28 NOTE — PROGRESS NOTES
Subjective     Radhika Villagomez is a 74 y.o. female who presents for the following: Skin Check.     Review of Systems:  No other skin or systemic complaints other than what is documented elsewhere in the note.    The following portions of the chart were reviewed this encounter and updated as appropriate:          Skin Cancer History  Biopsy Log Book  No skin cancers from Specimen Tracking.    Additional History      Specialty Problems          Dermatology Problems    Dermatitis, eczematoid    Actinic keratosis    Basal cell carcinoma of skin of left upper limb, including shoulder    Hemangioma of skin and subcutaneous tissue    Melanocytic nevi of trunk    Melanocytic nevi of unspecified lower limb, including hip    Melanocytic nevi of unspecified upper limb, including shoulder    Other melanin hyperpigmentation    Other seborrheic keratosis        Objective   Well appearing patient in no apparent distress; mood and affect are within normal limits.    A full examination was performed including scalp, head, eyes, ears, nose, lips, neck, chest, axillae, abdomen, back, buttocks, bilateral upper extremities, bilateral lower extremities, hands, feet, fingers, toes, fingernails, and toenails. All findings within normal limits unless otherwise noted below.      Assessment/Plan   Skin Exam  1. ACTINIC KERATOSIS (2)  Dorsum of Nose, Right Zygomatic Area  Thin erythematous papules with gritty scale  WHAT IS ACTINIC KERATOSIS?   - Actinic keratosis (AK) is a skin condition caused by sun damage. It causes scaly, rough, or bumpy spots on the skin.  - If left alone, AKs may turn into a skin cancer. People who burn easily or have trouble tanning are at more risk for developing AKs.   - There is no one test for AKs and diagnosis is made by clinical appearance. Treatment options include cryotherapy, therapy with lights, and various creams (e.g., topical 5-fluorocuracil, imiquimod).       To lower the chance of getting AK, you can:        ?  Stay out of the sun in the middle of the day (from 10 a.m. to 4 p.m.)       ?  Wear sunscreen - An SPF of at least 30 is best. The SPF number is on the sunscreen bottle or tube.       ?  Wear a wide-brimmed hat, long-sleeved shirt, long pants, or long skirt outside. A baseball hat does not give much protection.        ?  Do not use tanning beds.        ?  Keep a low-fat diet, less than 21% of calories should come from fat       ?  Take Vitamin B3 (nicotinomide) 500mg twice daily.      YOUR TREATMENT PLAN  - At this time I recommend treatment with cryotherapy.  - Possible side effects of liquid nitrogen treatment reviewed including formation of blisters, crusting, tenderness, scar, and discoloration which may be permanent.  - Patient advised to return the office for re-evaluation if the treated lesion(s) do not resolve within 4-6 weeks. Patient verbalizes understanding.  Destr of lesion - Dorsum of Nose, Right Zygomatic Area  Complexity: simple    Destruction method: cryotherapy    Informed consent: discussed and consent obtained    Timeout:  patient name, date of birth, surgical site, and procedure verified  Lesion destroyed using liquid nitrogen: Yes    Cryotherapy cycles:  2  Outcome: patient tolerated procedure well with no complications    Post-procedure details: wound care instructions given    Related Procedures  Follow Up In Dermatology - Established Patient  2. ANGIOMA OF SKIN  Generalized  Scattered cherry-red papule(s).  A cherry hemangioma is a small macule (small, flat, smooth area) or papule (small, solid bump) formed from an overgrowth of tiny blood vessels in the skin. Cherry hemangiomas are characteristically red or purplish in color. They often first appear in middle adulthood and usually increase in number with age. Cherry hemangiomas are noncancerous (benign) and are common in adults.    The present appearance of the lesion is not worrisome but it should continue to be observed and  testing/treatment may be warranted if change occurs.  Related Procedures  Follow Up In Dermatology - Established Patient  Follow Up In Dermatology - Established Patient  3. BENIGN NEVUS  Generalized  Scattered, uniform and benign-appearing, regular brown melanocytic papules and macules.  The present appearance of the lesion is not worrisome but it should continue to be observed and testing/treatment may be warranted if change occurs.  Related Procedures  Follow Up In Dermatology - Established Patient  Follow Up In Dermatology - Established Patient  4. SEBORRHEIC KERATOSIS  Generalized  Stuck on verrucous, tan-brown papules and plaques.    Seborrheic keratoses are common noncancerous (benign) growths of unknown cause seen in adults due to a thickening of an area of the top skin layer. Seborrheic keratoses may appear as if they are stuck on to the skin. They have distinct borders, and they may appear as papules (small, solid bumps) or plaques (solid, raised patches that are bigger than a thumbnail). They may be the same color as your skin, or they may be pink, light brown, darker brown, or very dark brown, or sometimes may appear black.    There is no way to prevent new seborrheic keratoses from forming. Seborrheic keratoses can be removed, but removal is considered a cosmetic issue and is usually not covered by insurance.    PLAN  No treatment is needed unless there is irritation from clothing, such as itching or bleeding.  2.   Some lotions containing alpha hydroxy acids, salicylic acid, or urea may make the areas feel smoother with regular use but will not eliminate them.  Related Procedures  Follow Up In Dermatology - Established Patient  Follow Up In Dermatology - Established Patient  5. LENTIGO  Generalized  Scattered tan macules in sun-exposed areas.  A solar lentigo (plural, solar lentigines), also known as a sun-induced freckle or senile lentigo, is a dark (hyperpigmented) lesion caused by natural or  artificial ultraviolet (UV) light. Solar lentigines may be single or multiple. This type of lentigo is different from a simple lentigo (lentigo simplex) because it is caused by exposure to UV light. Solar lentigines are benign, but they do indicate excessive sun exposure, a risk factor for the development of skin cancer.    To prevent solar lentigines, avoid exposure to sunlight in midday (10 AM to 3 PM), wear sun-protective clothing (tightly woven clothes and hats), and apply sunscreen (SPF 30 UVA and UVB block).    The present appearance of the lesion is not worrisome but it should continue to be observed and testing/treatment may be warranted if change occurs.  Related Procedures  Follow Up In Dermatology - Established Patient  Follow Up In Dermatology  Established Patient  6. SCAR CONDITIONS AND FIBROSIS OF SKIN (2)  Left Forearm - Anterior, Right Forearm - Anterior  Well healed scar at the site(s) of prior treatment with no evidence of recurrence.        The scar is clear, there is no evidence of recurrence.  The present appearance of the scar is not worrisome but it should continue to be observed and testing/treatment may be warranted if change occurs.    Related Procedures  Follow Up In Dermatology - Established Patient  Follow Up In Dermatology - Established Patient  7. HISTORY OF NONMELANOMA SKIN CANCER  Generalized  ABCDEs of melanoma and atypical moles were discussed with the patient.    Patient was instructed to perform monthly self skin examination.  We recommended that the patient have regular full skin exams given an increased risk of subsequent skin cancers.    The patient was instructed to use sun protective behaviors including use of broad spectrum sunscreens and sun protective clothing to reduce risk of skin cancers.    Warning signs of non-melanoma skin cancer discussed.  Related Procedures  Follow Up In Dermatology - Established Patient  Follow Up In Dermatology - Established Patient  8. SKIN  CHANGES DUE TO CHRONIC EXPOSURE TO NONIONIZING RADIATION  Generalized  Actinic changes in the form of freckles, lentigines and hyper/hypopigmentation   ABCDEs of melanoma and atypical moles were discussed with the patient.    Patient was instructed to perform monthly self skin examination.  We recommended that the patient have regular full skin exams given an increased risk of subsequent skin cancers.    The patient was instructed to use sun protective behaviors including use of broad spectrum sunscreens and sun protective clothing to reduce risk of skin cancers.    Warning signs of non-melanoma skin cancer discussed.  Related Procedures  Follow Up In Dermatology - Established Patient  Follow Up In Dermatology - Established Patient  9. DERMATITIS  Generalized  No red scaly macules or patches. 0% BSA  Reports using TAC BID PRN, not using more than 14 days a month. Continue with TAC BID PRN. No RF needed.     The following information regarding the use of topical steroids was provided: Local skin thinning,striae, and telangiectasia can occur with chronic application of this medication.  Long term use oftopical steroids should be avoided    Related Procedures  Follow Up In Dermatology - Established Patient  Follow Up In Dermatology - Established Patient      Return in 6 months for routine skin check or return to clinic sooner if needed

## 2025-05-01 DIAGNOSIS — F41.9 ANXIETY: ICD-10-CM

## 2025-05-01 RX ORDER — FLUOXETINE 20 MG/1
20 TABLET ORAL DAILY
Qty: 90 TABLET | Refills: 1 | Status: SHIPPED | OUTPATIENT
Start: 2025-05-01

## 2025-05-13 ENCOUNTER — HOSPITAL ENCOUNTER (OUTPATIENT)
Dept: CARDIOLOGY | Facility: CLINIC | Age: 75
Discharge: HOME | End: 2025-05-13
Payer: MEDICARE

## 2025-05-13 DIAGNOSIS — I49.5 SICK SINUS SYNDROME DUE TO SA NODE DYSFUNCTION (MULTI): ICD-10-CM

## 2025-05-13 DIAGNOSIS — Z95.0 PACEMAKER: ICD-10-CM

## 2025-05-13 PROCEDURE — 93280 PM DEVICE PROGR EVAL DUAL: CPT

## 2025-05-13 PROCEDURE — 93280 PM DEVICE PROGR EVAL DUAL: CPT | Performed by: INTERNAL MEDICINE

## 2025-06-04 ENCOUNTER — APPOINTMENT (OUTPATIENT)
Dept: PULMONOLOGY | Facility: CLINIC | Age: 75
End: 2025-06-04
Payer: MEDICARE

## 2025-06-04 VITALS
OXYGEN SATURATION: 93 % | HEART RATE: 70 BPM | SYSTOLIC BLOOD PRESSURE: 131 MMHG | DIASTOLIC BLOOD PRESSURE: 84 MMHG | BODY MASS INDEX: 26.11 KG/M2 | WEIGHT: 138.2 LBS

## 2025-06-04 DIAGNOSIS — D86.9 SARCOIDOSIS: ICD-10-CM

## 2025-06-04 DIAGNOSIS — J44.9 CHRONIC OBSTRUCTIVE PULMONARY DISEASE, UNSPECIFIED COPD TYPE (MULTI): ICD-10-CM

## 2025-06-04 DIAGNOSIS — J45.909 ASTHMA, UNSPECIFIED ASTHMA SEVERITY, UNSPECIFIED WHETHER COMPLICATED, UNSPECIFIED WHETHER PERSISTENT (HHS-HCC): Primary | ICD-10-CM

## 2025-06-04 PROCEDURE — 1036F TOBACCO NON-USER: CPT | Performed by: PEDIATRICS

## 2025-06-04 PROCEDURE — 99215 OFFICE O/P EST HI 40 MIN: CPT | Performed by: PEDIATRICS

## 2025-06-04 PROCEDURE — 3079F DIAST BP 80-89 MM HG: CPT | Performed by: PEDIATRICS

## 2025-06-04 PROCEDURE — 1159F MED LIST DOCD IN RCRD: CPT | Performed by: PEDIATRICS

## 2025-06-04 PROCEDURE — 3075F SYST BP GE 130 - 139MM HG: CPT | Performed by: PEDIATRICS

## 2025-06-04 PROCEDURE — 1160F RVW MEDS BY RX/DR IN RCRD: CPT | Performed by: PEDIATRICS

## 2025-06-04 RX ORDER — BUDESONIDE AND FORMOTEROL FUMARATE DIHYDRATE 160; 4.5 UG/1; UG/1
2 AEROSOL RESPIRATORY (INHALATION)
Qty: 10.2 G | Refills: 11 | Status: SHIPPED | OUTPATIENT
Start: 2025-06-04 | End: 2026-06-04

## 2025-06-04 RX ORDER — ALBUTEROL SULFATE 90 UG/1
2 INHALANT RESPIRATORY (INHALATION) EVERY 6 HOURS PRN
Qty: 18 G | Refills: 11 | Status: SHIPPED | OUTPATIENT
Start: 2025-06-04 | End: 2026-06-04

## 2025-06-04 NOTE — PROGRESS NOTES
Subjective   Patient ID: Radhika Villagomez is a 74 y.o. female who presents for sarcoidosis, asthma      HPI    6/4/2025:  Ms Villagomez has had increased shortness of breath with exertion lately.  She has complete heart block with a pacer but her cardiac status has been good.  She was not needing inhalers at all previously but lately she has needed albuterol more often.    2/25/2021:  Ms Villagomez is doing OK she feels much better after prednisone taper. She had HRCT done which shows stable changes form sarcoidosis. PFT done shows moderately severe obstruction that is slightly worse than in 2020. She is getting worked up by cardiology and has been found to have mitral regurgitation and tricuspid regurgitation. That is being addressed.         1/28/2021: Ms. Villagomez is a 69 y.o woman, never a smoker being evaluated for sarcoidosis and hospital follow up. She was admitted with SOB and found to have a pacer wire fracture and incidentally found to have covid. That has been taken care of but she is left with continued shortness of breath since. She is still using dulera and albuterol. A CXR was done on admission in November which looked a bit congested but nothing focal. She had an echo 1/21/21 which showed severe TR but normal EF.         06/18/2020: Patient is here today for test results. Chest CT which does show a small increase in her sarcoidosis since December 2020. At that shows stability since her last chest CT in February 2020. She has been feeling really well she uses Dulera regularly and notes that it really does help with her breathing. She shortness of breath on occasion especially with weather changes. PFT which shows an FEV1 of 74% predicted. DLCO was slightly low at 60. Patient has had asthma for many years and it has been well controlled     4/1/2020 At baseline, she has dyspnea on exertion, but none at rest. Her symptoms started many years ago, but has mostly been stable.She was diagnosed with sarcoidosis several  years ago via biopsy. Her current pulmonologist is retiring. She was hospitalized the end of February for pneumonia. Her chest CT didn't show a few new pulmonary nodules. Which we will recheck in 3 months. Dr. Garcia did send over her old records her last PFT was in 2016. She needs a new PFT once our facility is open again for testing. She uses Dulera 100 regularly and has a nebulizer at home. Complains of very little shortness of breath. She does get her eyes checked regularly she is followed by cardiology. She has CHF and a pacemaker. No rashes or joint pain. Patient has no nausea or vomiting or diarrhea. No fevers.           Previous pulmonary history:   She has no history of recurrent infections, or lung disease as a child. She was previously told she has sarcoidosis . She currently is on no supplemental oxygen. She has never been to pulmonary rehab.     Inhalers/nebulized medications: Dulera 100, albuterol      Hospitalization History:  She has been hospitalized over the last year for breathing related problem. 2020     Sleep history:  Denies snoring, apneas, feeling tired during the day or taking naps during the day. Admits to feeling tired during the day.     Comorbidities:   Anxiety  CHF  Pacemaker     SH:  smoking: never smoker  drinking: none  illicit drug use: none     Occupation: (Full questionnaire on exposures obtained, discussed with the patient and scanned to EMR)  worked at a workshop for [employer]  No known exposure to asbestos, silica or beryllium     Family History:   Her mother  from sarcoidosis at the age of 57     Imaging history: (I have personally reviewed the imaging below)   2020: CHest CT - I lateral patchy groundglass opacities. New numerous bilateral LL nodules up to 9 mm      PFTs: PENDING   // -> FEV1/FVC ratio/FEV1 (no BD response)/FVC/DLCO /TLC/RV to TLC ratio     6 MWTs: None on record      Review of Systems    See scanned documents attached to this note for review of  systems, and appropriate scales/scores for this visit.     Objective   Physical Exam  Constitutional:       Appearance: Normal appearance.   HENT:      Head: Normocephalic and atraumatic.      Mouth/Throat:      Pharynx: Oropharynx is clear.   Cardiovascular:      Rate and Rhythm: Normal rate and regular rhythm.      Pulses: Normal pulses.      Heart sounds: Normal heart sounds.   Pulmonary:      Effort: Pulmonary effort is normal.      Breath sounds: Normal breath sounds. No wheezing, rhonchi or rales.   Abdominal:      General: Bowel sounds are normal.      Palpations: Abdomen is soft.   Musculoskeletal:         General: Normal range of motion.   Skin:     General: Skin is warm and dry.   Neurological:      General: No focal deficit present.      Mental Status: She is alert and oriented to person, place, and time.   Psychiatric:         Mood and Affect: Mood normal.       Assessment/Plan     1. Sarcoidosis/asthma: more short of breath now but ? sarcoid vs covid vs cardiac. better now after prednisone   - Diagnosed many years ago via biopsy   - No rashes, cough, fevers, f/u with eye doc/cardiology regularly   - Uses Dulera regularly   - CT shows no changes of sarcoidosis but mosaic attenuation which could be due to valvular heart disease  6/4/2025:  trial of symbicort (it appears that may be less expensive than dulera), continue albuterol as needed  - if symptoms do not resolve with resumption of inhalers will get repeat PFT and CT chest        2 Lung nodules   - Several new small lung nodules noted on most recent chest CT stable since 2012  - yearly CT for nodules/sarcoid. but given worse symptoms now will get earlier, then begin yearly CT  repeat CT 2/2022  Has not had CT since November but nodules were stable, had RML pneumonia then.    - if inhalers do not resolve symptoms will get repeat CT    Follow up 2 months          Tru Kwon MD 06/04/25 8:50 AM

## 2025-06-06 ENCOUNTER — OFFICE VISIT (OUTPATIENT)
Dept: PRIMARY CARE | Facility: CLINIC | Age: 75
End: 2025-06-06
Payer: MEDICARE

## 2025-06-06 VITALS
SYSTOLIC BLOOD PRESSURE: 130 MMHG | HEART RATE: 70 BPM | WEIGHT: 137.8 LBS | BODY MASS INDEX: 26.04 KG/M2 | OXYGEN SATURATION: 94 % | DIASTOLIC BLOOD PRESSURE: 86 MMHG

## 2025-06-06 DIAGNOSIS — G44.209 MUSCLE TENSION HEADACHE: Primary | ICD-10-CM

## 2025-06-06 DIAGNOSIS — S46.811A STRAIN OF RIGHT TRAPEZIUS MUSCLE, INITIAL ENCOUNTER: ICD-10-CM

## 2025-06-06 PROCEDURE — 3075F SYST BP GE 130 - 139MM HG: CPT | Performed by: FAMILY MEDICINE

## 2025-06-06 PROCEDURE — 1159F MED LIST DOCD IN RCRD: CPT | Performed by: FAMILY MEDICINE

## 2025-06-06 PROCEDURE — 2500000004 HC RX 250 GENERAL PHARMACY W/ HCPCS (ALT 636 FOR OP/ED): Mod: JZ | Performed by: FAMILY MEDICINE

## 2025-06-06 PROCEDURE — 3079F DIAST BP 80-89 MM HG: CPT | Performed by: FAMILY MEDICINE

## 2025-06-06 PROCEDURE — 99213 OFFICE O/P EST LOW 20 MIN: CPT | Performed by: FAMILY MEDICINE

## 2025-06-06 PROCEDURE — 99213 OFFICE O/P EST LOW 20 MIN: CPT | Mod: 25 | Performed by: FAMILY MEDICINE

## 2025-06-06 PROCEDURE — 1125F AMNT PAIN NOTED PAIN PRSNT: CPT | Performed by: FAMILY MEDICINE

## 2025-06-06 PROCEDURE — 1036F TOBACCO NON-USER: CPT | Performed by: FAMILY MEDICINE

## 2025-06-06 PROCEDURE — 96372 THER/PROPH/DIAG INJ SC/IM: CPT | Performed by: FAMILY MEDICINE

## 2025-06-06 RX ORDER — TIZANIDINE 4 MG/1
2-4 TABLET ORAL EVERY 8 HOURS PRN
Qty: 30 TABLET | Refills: 0 | Status: SHIPPED | OUTPATIENT
Start: 2025-06-06 | End: 2025-06-16

## 2025-06-06 RX ORDER — METHYLPREDNISOLONE 4 MG/1
TABLET ORAL
Qty: 1 EACH | Refills: 0 | Status: SHIPPED | OUTPATIENT
Start: 2025-06-06

## 2025-06-06 RX ORDER — METHYLPREDNISOLONE ACETATE 80 MG/ML
80 INJECTION, SUSPENSION INTRA-ARTICULAR; INTRALESIONAL; INTRAMUSCULAR; SOFT TISSUE ONCE
Status: COMPLETED | OUTPATIENT
Start: 2025-06-06 | End: 2025-06-06

## 2025-06-06 RX ORDER — KETOROLAC TROMETHAMINE 30 MG/ML
60 INJECTION, SOLUTION INTRAMUSCULAR; INTRAVENOUS ONCE
Status: COMPLETED | OUTPATIENT
Start: 2025-06-06 | End: 2025-06-06

## 2025-06-06 RX ADMIN — KETOROLAC TROMETHAMINE 60 MG: 60 INJECTION, SOLUTION INTRAMUSCULAR at 17:13

## 2025-06-06 RX ADMIN — METHYLPREDNISOLONE ACETATE 80 MG: 80 INJECTION, SUSPENSION INTRA-ARTICULAR; INTRALESIONAL; INTRAMUSCULAR; SOFT TISSUE at 17:13

## 2025-06-06 ASSESSMENT — PATIENT HEALTH QUESTIONNAIRE - PHQ9
SUM OF ALL RESPONSES TO PHQ9 QUESTIONS 1 AND 2: 0
1. LITTLE INTEREST OR PLEASURE IN DOING THINGS: NOT AT ALL
2. FEELING DOWN, DEPRESSED OR HOPELESS: NOT AT ALL

## 2025-06-06 ASSESSMENT — PAIN SCALES - GENERAL: PAINLEVEL_OUTOF10: 9

## 2025-06-06 NOTE — PROGRESS NOTES
Subjective   Patient ID: Radhika Villagomez is a 74 y.o. female who presents for Headache.    HPI   Pt comes in co headaches for the last 2 weeks: starts at back of head on right side and radiates on right side of head.  Has been very stressed last 2 wks with death of dog and family member medical issues.  Taking tylenol and ibuprofen multiple times a day the last 14 days as well.  Has not used heat or ice.  No phonophobia/photophobia/v/blurred vision/bloodshot right eye/lacrimation of right eye.  Just occasional nausea.      Medical History[1]  Current Medications[2]    Review of Systems see hpi    Objective   /86   Pulse 70   Wt 62.5 kg (137 lb 12.8 oz)   SpO2 94%   BMI 26.04 kg/m²     Physical Exam  Vitals reviewed.   Constitutional:       Appearance: Normal appearance.   Neck:        Comments: Tender over right trapezius muscle and at insertion at occiput.  Decreased ROM with forward flexion, turning to left side and dipping head to left side.  Some discomfort on right side of neck with upward resistance of arms.  Musculoskeletal:      Cervical back: Muscular tenderness present. No pain with movement. Decreased range of motion.   Neurological:      Mental Status: She is alert.         Assessment/Plan   Assessment & Plan  Muscle tension headache  Reassured pt not migraines or cluster headaches.  Orders:    methylPREDNISolone acetate (DEPO-Medrol) injection 80 mg    ketorolac (Toradol) injection 60 mg    Strain of right trapezius muscle, initial encounter   Heating pad to back of neck 20 min/hour several times a day.  No driving if taking tizanidine during the day.  Exercises given to pt for HEP.    Orders:    tiZANidine (Zanaflex) 4 mg tablet; Take 0.5-1 tablets (2-4 mg) by mouth every 8 hours if needed for muscle spasms for up to 10 days.    methylPREDNISolone (Medrol Dospak) 4 mg tablets; Take each day's dose once a day in the AM with food                  [1]   Past Medical History:  Diagnosis Date     Allergic contact dermatitis due to plants, except food 06/20/2014    Asthma 05/01/2023    Atrial enlargement, bilateral 08/15/2023    Basal cell carcinoma of skin of left upper limb, including shoulder 08/14/2023    Benign essential hypertension 05/01/2023    Bradycardia 09/07/2024    Breast pain, left 05/01/2023    Candidiasis of skin and nail 04/07/2014    Candidal intertrigo    Chronic obstructive pulmonary disease, unspecified COPD type (Multi) 10/12/2023    Community acquired pneumonia 09/07/2024    Depression with anxiety     Dermatitis, eczematoid 05/01/2023    Diastolic dysfunction 05/01/2023    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    Hx of atrial septal defect repair 08/15/2023    Insomnia 10/27/2023    Moderate pulmonary valve insufficiency 08/15/2023    Nonrheumatic mitral valve regurgitation 08/15/2023    Mod  to  severe         Nonrheumatic tricuspid valve regurgitation 08/15/2023    Other seborrheic keratosis 08/14/2023    Pacemaker 05/01/2023    Panic disorder (episodic paroxysmal anxiety) 09/06/2018    Panic disorder without agoraphobia    Personal history of colonic polyps 10/07/2016    History of colon polyps    Pulmonary HTN (Multi) 08/15/2023    mild      Sarcoidosis 05/01/2023    Unspecified injury of lower back, subsequent encounter 08/20/2016    Injury of back, subsequent encounter    Unspecified open wound of unspecified toe(s) with damage to nail, initial encounter 06/04/2014    Traumatic avulsion of nail plate of toe   [2]   Current Outpatient Medications   Medication Sig Dispense Refill    albuterol (ProAir HFA) 90 mcg/actuation inhaler Inhale 2 puffs every 6 hours if needed for wheezing or shortness of breath. 18 g 11    amoxicillin (Amoxil) 500 mg capsule Take 4 capsules (2,000 mg) by mouth. Prior to dental procedures      budesonide-formoterol (Symbicort) 160-4.5 mcg/actuation inhaler Inhale 2 puffs 2 times a day. Rinse mouth with water after use to reduce aftertaste and  incidence of candidiasis. Do not swallow. 10.2 g 11    bumetanide (Bumex) 1 mg tablet Take 1 tablet (1 mg) by mouth once daily. (Patient taking differently: Take 0.5 tablets (0.5 mg) by mouth once daily.)      carvedilol (Coreg) 6.25 mg tablet Take 1 tablet (6.25 mg) by mouth once daily at bedtime.      FLUoxetine (PROzac) 20 mg tablet TAKE 1 TABLET(20 MG) BY MOUTH DAILY 90 tablet 1    ibuprofen 600 mg tablet Take 1 tablet (600 mg) by mouth every 8 hours if needed for mild pain (1 - 3) or fever (temp greater than 38.0 C). 30 tablet 0    rosuvastatin (Crestor) 10 mg tablet Take 1 tablet (10 mg) by mouth once daily. 90 tablet 2    methylPREDNISolone (Medrol Dospak) 4 mg tablets Take each day's dose once a day in the AM with food 1 each 0    spironolactone (Aldactone) 25 mg tablet Take 1 tablet (25 mg) by mouth early in the morning.. (Patient not taking: Reported on 6/6/2025)      tiZANidine (Zanaflex) 4 mg tablet Take 0.5-1 tablets (2-4 mg) by mouth every 8 hours if needed for muscle spasms for up to 10 days. 30 tablet 0     Current Facility-Administered Medications   Medication Dose Route Frequency Provider Last Rate Last Admin    ketorolac (Toradol) injection 60 mg  60 mg intramuscular Once Tia Crowley MD        methylPREDNISolone acetate (DEPO-Medrol) injection 80 mg  80 mg intramuscular Once Tia Crowley MD

## 2025-06-06 NOTE — PATIENT INSTRUCTIONS
Heating pad 20 min/hour several hours a day on the back of the  neck  Exercises do after the heating session above  You can try taking the muscle relaxer, tizanidine, during the day at 1/2 dose but be aware of drowsiness.   Prednisone pills can be taken all at once with breakfast: you don't need to take them 4x/day as on the box.

## 2025-06-10 ENCOUNTER — HOSPITAL ENCOUNTER (OUTPATIENT)
Dept: CARDIOLOGY | Facility: CLINIC | Age: 75
Discharge: HOME | End: 2025-06-10
Payer: MEDICARE

## 2025-06-10 DIAGNOSIS — Z95.0 PACEMAKER: ICD-10-CM

## 2025-06-10 DIAGNOSIS — I49.5 SICK SINUS SYNDROME DUE TO SA NODE DYSFUNCTION (MULTI): ICD-10-CM

## 2025-08-06 ENCOUNTER — APPOINTMENT (OUTPATIENT)
Dept: PULMONOLOGY | Facility: CLINIC | Age: 75
End: 2025-08-06
Payer: MEDICARE

## 2025-08-06 VITALS
SYSTOLIC BLOOD PRESSURE: 122 MMHG | BODY MASS INDEX: 26.23 KG/M2 | HEART RATE: 76 BPM | OXYGEN SATURATION: 92 % | DIASTOLIC BLOOD PRESSURE: 79 MMHG | WEIGHT: 138.8 LBS

## 2025-08-06 DIAGNOSIS — J45.909 ASTHMA, UNSPECIFIED ASTHMA SEVERITY, UNSPECIFIED WHETHER COMPLICATED, UNSPECIFIED WHETHER PERSISTENT (HHS-HCC): ICD-10-CM

## 2025-08-06 DIAGNOSIS — D86.9 SARCOIDOSIS: Primary | ICD-10-CM

## 2025-08-06 PROCEDURE — 99215 OFFICE O/P EST HI 40 MIN: CPT | Performed by: PEDIATRICS

## 2025-08-06 PROCEDURE — 1036F TOBACCO NON-USER: CPT | Performed by: PEDIATRICS

## 2025-08-06 PROCEDURE — 1159F MED LIST DOCD IN RCRD: CPT | Performed by: PEDIATRICS

## 2025-08-06 PROCEDURE — 3074F SYST BP LT 130 MM HG: CPT | Performed by: PEDIATRICS

## 2025-08-06 PROCEDURE — 3078F DIAST BP <80 MM HG: CPT | Performed by: PEDIATRICS

## 2025-08-06 PROCEDURE — 1160F RVW MEDS BY RX/DR IN RCRD: CPT | Performed by: PEDIATRICS

## 2025-08-06 NOTE — PROGRESS NOTES
Subjective   Patient ID: Radhika Villagomez is a 74 y.o. female who presents for asthma, sarcoidosis    HPI    8/6/2025:  Ms Villagomez is doing much better on the symbicort.      6/4/2025:  Ms Villagomez has had increased shortness of breath with exertion lately.  She has complete heart block with a pacer but her cardiac status has been good.  She was not needing inhalers at all previously but lately she has needed albuterol more often.     2/25/2021:  Ms Villagomez is doing OK she feels much better after prednisone taper. She had HRCT done which shows stable changes form sarcoidosis. PFT done shows moderately severe obstruction that is slightly worse than in 2020. She is getting worked up by cardiology and has been found to have mitral regurgitation and tricuspid regurgitation. That is being addressed.         1/28/2021: Ms. Villagomez is a 69 y.o woman, never a smoker being evaluated for sarcoidosis and hospital follow up. She was admitted with SOB and found to have a pacer wire fracture and incidentally found to have covid. That has been taken care of but she is left with continued shortness of breath since. She is still using dulera and albuterol. A CXR was done on admission in November which looked a bit congested but nothing focal. She had an echo 1/21/21 which showed severe TR but normal EF.         06/18/2020: Patient is here today for test results. Chest CT which does show a small increase in her sarcoidosis since December 2020. At that shows stability since her last chest CT in February 2020. She has been feeling really well she uses Dulera regularly and notes that it really does help with her breathing. She shortness of breath on occasion especially with weather changes. PFT which shows an FEV1 of 74% predicted. DLCO was slightly low at 60. Patient has had asthma for many years and it has been well controlled     4/1/2020 At baseline, she has dyspnea on exertion, but none at rest. Her symptoms started many years ago, but has  mostly been stable.She was diagnosed with sarcoidosis several years ago via biopsy. Her current pulmonologist is retiring. She was hospitalized the end of February for pneumonia. Her chest CT didn't show a few new pulmonary nodules. Which we will recheck in 3 months. Dr. Garcia did send over her old records her last PFT was in 2016. She needs a new PFT once our facility is open again for testing. She uses Dulera 100 regularly and has a nebulizer at home. Complains of very little shortness of breath. She does get her eyes checked regularly she is followed by cardiology. She has CHF and a pacemaker. No rashes or joint pain. Patient has no nausea or vomiting or diarrhea. No fevers.           Previous pulmonary history:   She has no history of recurrent infections, or lung disease as a child. She was previously told she has sarcoidosis . She currently is on no supplemental oxygen. She has never been to pulmonary rehab.     Inhalers/nebulized medications: Dulera 100, albuterol      Hospitalization History:  She has been hospitalized over the last year for breathing related problem. 2020     Sleep history:  Denies snoring, apneas, feeling tired during the day or taking naps during the day. Admits to feeling tired during the day.     Comorbidities:   Anxiety  CHF  Pacemaker     SH:  smoking: never smoker  drinking: none  illicit drug use: none     Occupation: (Full questionnaire on exposures obtained, discussed with the patient and scanned to EMR)  worked at a workshop for [employer]  No known exposure to asbestos, silica or beryllium     Family History:   Her mother  from sarcoidosis at the age of 57     Imaging history: (I have personally reviewed the imaging below)   2020: CHest CT - I lateral patchy groundglass opacities. New numerous bilateral LL nodules up to 9 mm   Angio CT 2024: part calcified hilar adenopathy stable     PFTs:    2021: FVC 77%, FEV1 66%, FEF 25-75 33%, +BD, , DLCO 62%  DLCO/VA 86%    6 MWTs: None on record       Review of Systems    See scanned documents attached to this note for review of systems, and appropriate scales/scores for this visit.     Objective   Physical Exam  Constitutional:       Appearance: Normal appearance.   HENT:      Head: Normocephalic and atraumatic.      Mouth/Throat:      Pharynx: Oropharynx is clear.     Cardiovascular:      Rate and Rhythm: Normal rate and regular rhythm.      Pulses: Normal pulses.      Heart sounds: Normal heart sounds.   Pulmonary:      Effort: Pulmonary effort is normal.      Breath sounds: Normal breath sounds. No wheezing, rhonchi or rales.      Comments: Few end expiratory squeeks  Abdominal:      General: Bowel sounds are normal.      Palpations: Abdomen is soft.     Musculoskeletal:         General: Normal range of motion.     Skin:     General: Skin is warm and dry.     Neurological:      General: No focal deficit present.      Mental Status: She is alert and oriented to person, place, and time.     Psychiatric:         Mood and Affect: Mood normal.         Assessment/Plan     1. Sarcoidosis/asthma: more short of breath now but ? sarcoid vs covid vs cardiac. better now after prednisone   - Diagnosed many years ago via biopsy   - No rashes, cough, fevers, f/u with eye doc/cardiology regularly   - Uses Dulera regularly   - CT shows no changes of sarcoidosis but mosaic attenuation which could be due to valvular heart disease  6/4/2025:  trial of symbicort (it appears that may be less expensive than dulera), continue albuterol as needed  - if symptoms do not resolve with resumption of inhalers will get repeat PFT and CT chest   8/6/2025: doing much better.  Continue symbicort  Will get CT in November (1 year)      2 Lung nodules   - Several new small lung nodules noted on most recent chest CT stable since 2012  - yearly CT for nodules/sarcoid. but given worse symptoms now will get earlier, then begin yearly CT  repeat CT  2/2022  Has not had CT since November but nodules were stable, had RML pneumonia then.    - if inhalers do not resolve symptoms will get repeat CT     Follow up Carolyn Kwon MD 08/06/25 9:37 AM

## 2025-08-20 ENCOUNTER — PRE-ADMISSION TESTING (OUTPATIENT)
Dept: PREADMISSION TESTING | Facility: HOSPITAL | Age: 75
End: 2025-08-20
Payer: MEDICARE

## 2025-08-20 ENCOUNTER — ANESTHESIA EVENT (OUTPATIENT)
Dept: OPERATING ROOM | Facility: HOSPITAL | Age: 75
End: 2025-08-20
Payer: MEDICARE

## 2025-08-20 ASSESSMENT — DUKE ACTIVITY SCORE INDEX (DASI)
TOTAL_SCORE: 24.2
CAN YOU WALK INDOORS, SUCH AS AROUND YOUR HOUSE: YES
CAN YOU PARTICIPATE IN STRENOUS SPORTS LIKE SWIMMING, SINGLES TENNIS, FOOTBALL, BASKETBALL, OR SKIING: NO
CAN YOU WALK A BLOCK OR TWO ON LEVEL GROUND: YES
CAN YOU RUN A SHORT DISTANCE: NO
CAN YOU DO HEAVY WORK AROUND THE HOUSE LIKE SCRUBBING FLOORS OR LIFTING AND MOVING HEAVY FURNITURE: NO
CAN YOU CLIMB A FLIGHT OF STAIRS OR WALK UP A HILL: YES
CAN YOU TAKE CARE OF YOURSELF (EAT, DRESS, BATHE, OR USE TOILET): YES
CAN YOU DO MODERATE WORK AROUND THE HOUSE LIKE VACUUMING, SWEEPING FLOORS OR CARRYING GROCERIES: YES
CAN YOU PARTICIPATE IN MODERATE RECREATIONAL ACTIVITIES LIKE GOLF, BOWLING, DANCING, DOUBLES TENNIS OR THROWING A BASEBALL OR FOOTBALL: NO
CAN YOU DO YARD WORK LIKE RAKING LEAVES, WEEDING OR PUSHING A MOWER: NO
CAN YOU DO LIGHT WORK AROUND THE HOUSE LIKE DUSTING OR WASHING DISHES: YES
DASI METS SCORE: 5.7
CAN YOU HAVE SEXUAL RELATIONS: YES

## 2025-08-20 ASSESSMENT — PAIN - FUNCTIONAL ASSESSMENT: PAIN_FUNCTIONAL_ASSESSMENT: 0-10

## 2025-08-25 RX ORDER — ACETAMINOPHEN 325 MG/1
650 TABLET ORAL EVERY 4 HOURS PRN
OUTPATIENT
Start: 2025-08-25

## 2025-08-25 RX ORDER — ONDANSETRON HYDROCHLORIDE 2 MG/ML
4 INJECTION, SOLUTION INTRAVENOUS ONCE AS NEEDED
OUTPATIENT
Start: 2025-08-25

## 2025-08-25 RX ORDER — APREPITANT 40 MG/1
40 CAPSULE ORAL ONCE AS NEEDED
OUTPATIENT
Start: 2025-08-25

## 2025-08-29 ENCOUNTER — HOSPITAL ENCOUNTER (OUTPATIENT)
Dept: OPERATING ROOM | Facility: HOSPITAL | Age: 75
Discharge: HOME | End: 2025-08-29
Payer: MEDICARE

## 2025-08-29 ENCOUNTER — ANESTHESIA (OUTPATIENT)
Dept: OPERATING ROOM | Facility: HOSPITAL | Age: 75
End: 2025-08-29
Payer: MEDICARE

## 2025-08-29 VITALS
WEIGHT: 138 LBS | DIASTOLIC BLOOD PRESSURE: 66 MMHG | BODY MASS INDEX: 26.06 KG/M2 | HEIGHT: 61 IN | OXYGEN SATURATION: 95 % | HEART RATE: 75 BPM | RESPIRATION RATE: 17 BRPM | TEMPERATURE: 97.3 F | SYSTOLIC BLOOD PRESSURE: 112 MMHG

## 2025-08-29 DIAGNOSIS — I34.0 NONRHEUMATIC MITRAL VALVE REGURGITATION: ICD-10-CM

## 2025-08-29 DIAGNOSIS — I37.1 MODERATE PULMONARY VALVE INSUFFICIENCY: Primary | ICD-10-CM

## 2025-08-29 DIAGNOSIS — I36.1 NONRHEUMATIC TRICUSPID VALVE REGURGITATION: ICD-10-CM

## 2025-08-29 LAB
EJECTION FRACTION: 65 %
RIGHT VENTRICLE PEAK SYSTOLIC PRESSURE: 28 MMHG

## 2025-08-29 PROCEDURE — 2500000005 HC RX 250 GENERAL PHARMACY W/O HCPCS: Performed by: INTERNAL MEDICINE

## 2025-08-29 PROCEDURE — 2500000004 HC RX 250 GENERAL PHARMACY W/ HCPCS (ALT 636 FOR OP/ED): Performed by: PHYSICIAN ASSISTANT

## 2025-08-29 PROCEDURE — 7100000010 HC PHASE TWO TIME - EACH INCREMENTAL 1 MINUTE: Performed by: NURSE ANESTHETIST, CERTIFIED REGISTERED

## 2025-08-29 PROCEDURE — 7100000009 HC PHASE TWO TIME - INITIAL BASE CHARGE: Performed by: NURSE ANESTHETIST, CERTIFIED REGISTERED

## 2025-08-29 PROCEDURE — 3700000002 HC GENERAL ANESTHESIA TIME - EACH INCREMENTAL 1 MINUTE: Performed by: NURSE ANESTHETIST, CERTIFIED REGISTERED

## 2025-08-29 PROCEDURE — 93319 3D ECHO IMG CGEN CAR ANOMAL: CPT

## 2025-08-29 PROCEDURE — 2500000004 HC RX 250 GENERAL PHARMACY W/ HCPCS (ALT 636 FOR OP/ED): Performed by: NURSE ANESTHETIST, CERTIFIED REGISTERED

## 2025-08-29 PROCEDURE — 3700000001 HC GENERAL ANESTHESIA TIME - INITIAL BASE CHARGE: Performed by: NURSE ANESTHETIST, CERTIFIED REGISTERED

## 2025-08-29 RX ORDER — LIDOCAINE HYDROCHLORIDE 20 MG/ML
SOLUTION OROPHARYNGEAL
Status: DISPENSED
Start: 2025-08-29 | End: 2025-08-29

## 2025-08-29 RX ORDER — LIDOCAINE HYDROCHLORIDE 20 MG/ML
INJECTION, SOLUTION EPIDURAL; INFILTRATION; INTRACAUDAL; PERINEURAL AS NEEDED
Status: DISCONTINUED | OUTPATIENT
Start: 2025-08-29 | End: 2025-08-29

## 2025-08-29 RX ORDER — PROPOFOL 10 MG/ML
INJECTION, EMULSION INTRAVENOUS AS NEEDED
Status: DISCONTINUED | OUTPATIENT
Start: 2025-08-29 | End: 2025-08-29

## 2025-08-29 RX ORDER — SODIUM CHLORIDE, SODIUM LACTATE, POTASSIUM CHLORIDE, CALCIUM CHLORIDE 600; 310; 30; 20 MG/100ML; MG/100ML; MG/100ML; MG/100ML
75 INJECTION, SOLUTION INTRAVENOUS CONTINUOUS
Status: ACTIVE | OUTPATIENT
Start: 2025-08-29 | End: 2025-08-29

## 2025-08-29 RX ORDER — MIDAZOLAM HYDROCHLORIDE 2 MG/2ML
INJECTION, SOLUTION INTRAMUSCULAR; INTRAVENOUS AS NEEDED
Status: DISCONTINUED | OUTPATIENT
Start: 2025-08-29 | End: 2025-08-29

## 2025-08-29 RX ORDER — LIDOCAINE HYDROCHLORIDE 20 MG/ML
SOLUTION OROPHARYNGEAL AS NEEDED
Status: COMPLETED | OUTPATIENT
Start: 2025-08-29 | End: 2025-08-29

## 2025-08-29 RX ORDER — FENTANYL CITRATE 50 UG/ML
INJECTION, SOLUTION INTRAMUSCULAR; INTRAVENOUS AS NEEDED
Status: DISCONTINUED | OUTPATIENT
Start: 2025-08-29 | End: 2025-08-29

## 2025-08-29 RX ORDER — PSEUDOEPHEDRINE HCL 120 MG/1
120 TABLET, FILM COATED, EXTENDED RELEASE ORAL AS NEEDED
COMMUNITY

## 2025-08-29 RX ADMIN — SODIUM CHLORIDE, SODIUM LACTATE, POTASSIUM CHLORIDE, CALCIUM CHLORIDE: 600; 310; 30; 20 INJECTION, SOLUTION INTRAVENOUS at 10:33

## 2025-08-29 RX ADMIN — PROPOFOL 10 MG: 10 INJECTION, EMULSION INTRAVENOUS at 10:49

## 2025-08-29 RX ADMIN — MIDAZOLAM HYDROCHLORIDE 2 MG: 1 INJECTION, SOLUTION INTRAMUSCULAR; INTRAVENOUS at 10:39

## 2025-08-29 RX ADMIN — LIDOCAINE HYDROCHLORIDE 25 ML: 20 SOLUTION ORAL; TOPICAL at 10:36

## 2025-08-29 RX ADMIN — PROPOFOL 10 MG: 10 INJECTION, EMULSION INTRAVENOUS at 11:01

## 2025-08-29 RX ADMIN — PROPOFOL 50 MG: 10 INJECTION, EMULSION INTRAVENOUS at 10:45

## 2025-08-29 RX ADMIN — BENZOCAINE, BUTAMBEN, AND TETRACAINE HYDROCHLORIDE 1 SPRAY: .028; .004; .004 AEROSOL, SPRAY TOPICAL at 10:35

## 2025-08-29 RX ADMIN — PROPOFOL 10 MG: 10 INJECTION, EMULSION INTRAVENOUS at 10:51

## 2025-08-29 RX ADMIN — LIDOCAINE HYDROCHLORIDE 40 MG: 20 INJECTION, SOLUTION EPIDURAL; INFILTRATION; INTRACAUDAL; PERINEURAL at 10:45

## 2025-08-29 RX ADMIN — PROPOFOL 10 MG: 10 INJECTION, EMULSION INTRAVENOUS at 10:53

## 2025-08-29 RX ADMIN — PROPOFOL 10 MG: 10 INJECTION, EMULSION INTRAVENOUS at 10:59

## 2025-08-29 RX ADMIN — PROPOFOL 10 MG: 10 INJECTION, EMULSION INTRAVENOUS at 10:57

## 2025-08-29 RX ADMIN — PROPOFOL 10 MG: 10 INJECTION, EMULSION INTRAVENOUS at 10:55

## 2025-08-29 RX ADMIN — PROPOFOL 10 MG: 10 INJECTION, EMULSION INTRAVENOUS at 10:47

## 2025-08-29 RX ADMIN — FENTANYL CITRATE 25 MCG: 50 INJECTION, SOLUTION INTRAMUSCULAR; INTRAVENOUS at 10:42

## 2025-08-29 SDOH — HEALTH STABILITY: MENTAL HEALTH: CURRENT SMOKER: 0

## 2025-08-29 ASSESSMENT — ENCOUNTER SYMPTOMS
ABDOMINAL PAIN: 0
SHORTNESS OF BREATH: 0
FEVER: 0

## 2025-08-29 ASSESSMENT — PAIN - FUNCTIONAL ASSESSMENT
PAIN_FUNCTIONAL_ASSESSMENT: 0-10

## 2025-08-29 ASSESSMENT — PAIN SCALES - GENERAL
PAINLEVEL_OUTOF10: 0 - NO PAIN
PAINLEVEL_OUTOF10: 0 - NO PAIN
PAIN_LEVEL: 0
PAINLEVEL_OUTOF10: 0 - NO PAIN
PAINLEVEL_OUTOF10: 0 - NO PAIN

## 2025-10-06 ENCOUNTER — APPOINTMENT (OUTPATIENT)
Dept: PRIMARY CARE | Facility: CLINIC | Age: 75
End: 2025-10-06
Payer: MEDICARE

## 2025-10-28 ENCOUNTER — APPOINTMENT (OUTPATIENT)
Dept: DERMATOLOGY | Facility: CLINIC | Age: 75
End: 2025-10-28
Payer: MEDICARE

## 2025-12-01 ENCOUNTER — APPOINTMENT (OUTPATIENT)
Dept: PULMONOLOGY | Facility: CLINIC | Age: 75
End: 2025-12-01
Payer: MEDICARE